# Patient Record
Sex: MALE | Race: WHITE | NOT HISPANIC OR LATINO | Employment: OTHER | URBAN - NONMETROPOLITAN AREA
[De-identification: names, ages, dates, MRNs, and addresses within clinical notes are randomized per-mention and may not be internally consistent; named-entity substitution may affect disease eponyms.]

---

## 2018-03-13 ENCOUNTER — OFFICE VISIT CONVERTED (OUTPATIENT)
Dept: FAMILY MEDICINE CLINIC | Age: 63
End: 2018-03-13
Attending: NURSE PRACTITIONER

## 2018-03-20 ENCOUNTER — OFFICE VISIT CONVERTED (OUTPATIENT)
Dept: FAMILY MEDICINE CLINIC | Age: 63
End: 2018-03-20
Attending: NURSE PRACTITIONER

## 2018-07-16 ENCOUNTER — OFFICE VISIT CONVERTED (OUTPATIENT)
Dept: FAMILY MEDICINE CLINIC | Age: 63
End: 2018-07-16
Attending: NURSE PRACTITIONER

## 2018-12-12 ENCOUNTER — OFFICE VISIT CONVERTED (OUTPATIENT)
Dept: FAMILY MEDICINE CLINIC | Age: 63
End: 2018-12-12
Attending: NURSE PRACTITIONER

## 2021-02-10 ENCOUNTER — OFFICE VISIT CONVERTED (OUTPATIENT)
Dept: FAMILY MEDICINE CLINIC | Age: 66
End: 2021-02-10
Attending: NURSE PRACTITIONER

## 2021-05-18 NOTE — PROGRESS NOTES
AniZaheer  1955     Office/Outpatient Visit    Visit Date: Wed, Feb 10, 2021 08:37 am    Provider: Maricel Sandra N.P. (Assistant: Merissa Cano LPN)    Location: [facility]        Electronically signed by Maricel Sandra N.P. on  02/10/2021 09:31:41 AM                             Subjective:        CC: Mr. Law is a 65 year old White male.  This is his first visit to the clinic.  since 2018        HPI:           PHQ-9 Depression Screening: Completed form scanned and in chart; Total Score 0           Essential (primary) hypertension details; this was first diagnosed more than 5 years ago.  Current nonpharmacologic treatment includes exercise and a 12 pound weight loss.  He is not currently taking an antihypertensive.  had been taking lisinopril 40 mg daily until 3 yrs ago.  became less active and admits to poor diet.  also with strong family hx of HTN.  tolerated lisinopril without difficulty.  wants to restart lisinopril.      ROS:     CONSTITUTIONAL:  Negative for chills, fatigue, fever, and weight change.      CARDIOVASCULAR:  Negative for chest pain, palpitations, tachycardia, orthopnea, and edema.      RESPIRATORY:  Negative for cough, dyspnea, and hemoptysis.      GASTROINTESTINAL:  Negative for abdominal pain, heartburn, constipation, diarrhea, and stool changes.      GENITOURINARY:  Negative for dysuria, genital lesions, hematuria, impotence, polyuria, and changes in urine stream.      NEUROLOGICAL:  Negative for dizziness, headaches, paresthesias, and weakness.      PSYCHIATRIC:  Negative for anxiety, depression, and sleep disturbances.          Past Medical History / Family History / Social History:         Last Reviewed on 2/10/2021 08:46 AM by Maricel Sandra    Past Medical History:             PAST MEDICAL HISTORY             PREVENTIVE HEALTH MAINTENANCE             COLORECTAL CANCER SCREENING: declines colorectal cancer screening, understands reason for  testing; NEVER     PSA: has never been done declines-  understands reason for testing.          Surgical History:     NONE         Family History:     Father: Congestive Heart Failure ( lived until 79 yo ); Hypertension; Myocardial Infarction     Mother: Lung Cancer ( diagnosed @ 82 - (+smoker) )     Paternal Grandfather: Myocardial Infarction     Paternal Grandmother: Cancer (unspecified type)     Maternal Grandfather: Cirrhosis ( r/t ETOH )     Maternal Grandmother: Lung Cancer         Social History:     Occupation: [employer] -     Marital Status:      Children: None         Tobacco/Alcohol/Supplements:     Last Reviewed on 2/10/2021 08:40 AM by Merissa Cano    Tobacco: He has a past history of cigarette smoking; quit date:  2016.   Current Smoker: He currently smokes every day, E-Cigarette.  Non-drinker     Caffeine:  He admits to consuming caffeine via coffee ( 3 servings per day ).          Substance Abuse History:     Last Reviewed on 3/13/2018 05:30 PM by Johanny Henry    None         Mental Health History:     Last Reviewed on 3/13/2018 05:30 PM by Johanny Henry    NEGATIVE         Communicable Diseases (eg STDs):     Last Reviewed on 3/13/2018 05:30 PM by Johanny Henry    Reportable health conditions;     Hepatitis (unsure which kind)         Current Problems:     Last Reviewed on 2/10/2021 08:46 AM by Maricel Sandra    Essential (primary) hypertension    Encounter for screening for depression        Immunizations:     None        Allergies:     Last Reviewed on 12/12/2018 11:33 AM by Spurling, Sarah C    No Known Allergies.        Current Medications:     Last Reviewed on 12/12/2018 11:37 AM by Spurling, Sarah C    None        Objective:        Vitals:         Historical:     12/12/2018  BP:   161/89 mm Hg ( (left arm, , sitting, );) 7/16/2018  BP:   156/90 mm Hg ( (left arm, , sitting, );) 3/20/2018  BP:   178/82 mm Hg ( (left arm, , sitting, );) 12/12/2018  Wt:    246.8lbs    Current: 2/10/2021 8:44:59 AM    Ht:  6 ft, 0 in;  Wt: 243 lbs;  BMI: 33.0T: 96.8 F (oral);  BP: 192/96 mm Hg (left arm, sitting);  P: 103 bpm (left arm (BP Cuff), sitting)        Exams:     PHYSICAL EXAM:     GENERAL:  well developed and nourished; appropriately groomed; in no apparent distress;     NECK: thyroid is non-palpable;     RESPIRATORY: normal respiratory rate and pattern with no distress; normal breath sounds with no rales, rhonchi, wheezes or rubs;     CARDIOVASCULAR: normal rate; rhythm is regular;  no edema;     MUSCULOSKELETAL:  Normal range of motion, strength and tone;     NEUROLOGIC: mental status: alert and oriented x 3; GROSSLY INTACT     PSYCHIATRIC:  appropriate affect and demeanor; normal speech pattern; grossly normal memory;         Assessment:         Z13.31   Encounter for screening for depression       I10   Essential (primary) hypertension           ORDERS:         Meds Prescribed:       [New Rx] lisinopriL 40 mg oral tablet [take 1 tablet (40 mg) by oral route once daily], #90 (ninety) tablets, Refills: 1 (one)         Lab Orders:       [id number]  HTNLP - [facility] CMP AND LIPID: [id number]  (Send-Out)              Other Orders:         Depression screen negative  (In-House)                      Plan:         Encounter for screening for depression    MIPS PHQ-9 Depression Screening: Completed form scanned and in chart; Total Score 0; Negative Depression Screen           Orders:         Depression screen negative  (In-House)              Essential (primary) hypertension    LABORATORY:  Labs ordered to be performed today include HTN/Lipid Panel: CMP, Lipid.      RECOMMENDATIONS given include: perform routine monitoring of blood pressure with home blood pressure cuff, have spouse or friend monitor for loud snoring/sleep apnea, reduction of dietary salt intake, take medication as prescribed, try not to miss doses, smoking cessation, weight loss, stress reduction, and has done  well on lisinopril in the past.  need to get labs as below.  wife is a nurse and monitoring bp at home.  follow up in 4-6 wks if bp not under control on lisinopril.  pt verbalized understanding..            Prescriptions:       [New Rx] lisinopriL 40 mg oral tablet [take 1 tablet (40 mg) by oral route once daily], #90 (ninety) tablets, Refills: 1 (one)           Orders:       [id number]  HTN - [facility] CMP AND LIPID: [id number]  (Send-Out)                Patient Education Handouts:       High Blood Pressure (HTN)              Patient Recommendations:        For  Essential (primary) hypertension:    Begin monitoring your blood pressure by brief nurse visits at our office, a home blood pressure monitor, or by checking on the machines in pharmacies or stores.  Keep a log of the readings. Obstructive sleep apnea is much more prevalent than historically reported and is a greatly under recognized cause of hypertension. If you have loud snoring, if you wake up tired and feel tired thru out the day, you may have sleep apnea.  One way to suspect this is if a loved one reports that you snore very loudly or if you seem to quit breathing for a time while you are asleep.  If this describes you, you should consult your doctor about have a sleep study performed. Reduce the amount of salt in your diet. Stop smoking! Try to lose some weight; even modest weight reduction can improve your blood pressure. Try and reduce the effects of stress on blood pressure by relaxation, meditation, biofeedback, exercise or other stress reduction methods.              Charge Capture:         Primary Diagnosis:     Z13.31  Encounter for screening for depression           Orders:      [id number]  Office/outpatient visit; established patient, level 3  (In-House)              Depression screen negative  (In-House)              I10  Essential (primary) hypertension

## 2021-05-18 NOTE — PROGRESS NOTES
Zaheer Law 1955     Office/Outpatient Visit    Visit Date: Wed, Dec 12, 2018 11:33 am    Provider: Johanny Henry N.P. (Assistant: Sarah Spurling, MA)    Location: [facility]        Electronically signed by Johanny Henry N.P. on  12/16/2018 11:23:49 PM                             SUBJECTIVE:        CC:     Mr. Law is a 63 year old White male.  Pt is here for med refills (not sure of the name).          HPI:         Mr. Lwa presents with hypertension.  This was first diagnosed several years ago.  He is not using any nonpharmacologic treatment modalities.  He is not currently taking an antihypertensive.  Mr. Law does not check his blood pressure other than at his clinic appointments.  He is tolerating the medication well without side effects.  Compliance with treatment has been poor; he has not been taking his medications, does not follow a diet and exercise regimen, and does not follow-up as directed.      ROS:     CONSTITUTIONAL:  Negative for chills, fatigue and fever.      CARDIOVASCULAR:  Negative for chest pain and pedal edema.      RESPIRATORY:  Negative for recent cough and dyspnea.      GASTROINTESTINAL:  Negative for abdominal pain, constipation, diarrhea, heartburn, nausea and vomiting.      NEUROLOGICAL:  Negative for headaches.          PM/FMH/SH:     Last Reviewed on 7/16/2018 11:47 AM by Johanny Henry    Past Medical History:             PAST MEDICAL HISTORY             PREVENTIVE HEALTH MAINTENANCE             COLORECTAL CANCER SCREENING: NEVER     EYE EXAM: was last done 2015         Surgical History:     NONE         Family History:     Father: Congestive Heart Failure ( lived until 79 yo ); Myocardial Infarction     Mother: Lung Cancer ( diagnosed @ 82 - (+smoker) )     Paternal Grandfather: Myocardial Infarction     Paternal Grandmother: Cancer (unspecified type)     Maternal Grandfather: Cirrhosis ( r/t ETOH )     Maternal Grandmother: Lung Cancer         Social  History:     Occupation: [employer] -     Marital Status:      Children: None         Tobacco/Alcohol/Supplements:     Last Reviewed on 12/12/2018 11:33 AM by Spurling, Sarah C    Tobacco: He has a past history of cigarette smoking; quit date:  2016.   Current Smoker: He currently smokes every day, E-Cigarette.  Non-drinker     Caffeine:  He admits to consuming caffeine via coffee ( 3 servings per day ).          Substance Abuse History:     Last Reviewed on 3/13/2018 05:30 PM by Johanny Henry         Mental Health History:     Last Reviewed on 3/13/2018 05:30 PM by Johanny Henry    NEGATIVE         Communicable Diseases (eg STDs):     Last Reviewed on 3/13/2018 05:30 PM by Johanny Henry    Reportable health conditions;     Hepatitis (unsure which kind)             Current Problems:     Last Reviewed on 7/16/2018 11:36 AM by Johanyn Henry    Hypertension         Immunizations:     None        Allergies:     Last Reviewed on 12/12/2018 11:33 AM by Spurling, Sarah C      No Known Drug Allergies.         Current Medications:     Last Reviewed on 12/12/2018 11:37 AM by Spurling, Sarah C    None        OBJECTIVE:        Vitals:         Current: 12/12/2018 11:38:25 AM    Ht:  6 ft, 1 in;  Wt: 246.8 lbs;  BMI: 32.6    T: 98.4 F (oral);  BP: 161/89 mm Hg (left arm, sitting);  P: 93 bpm (left arm (BP Cuff), sitting)        Repeat: 158/92         Exams:     PHYSICAL EXAM:     GENERAL: Vitals recorded well developed, well nourished,  mildly obese;  no apparent distress;     E/N/T: EARS: external auditory canal normal bilaterally;  bilateral TMs are normal;  NOSE:  normal nasal mucosa, septum, turbinates, and sinuses; OROPHARYNX:  normal mucosa, dentition, gingiva, and posterior pharynx;     RESPIRATORY: normal appearance and symmetric expansion of chest wall; normal respiratory rate and pattern with no distress;     CARDIOVASCULAR: normal rate; rhythm is regular;  no edema;      GASTROINTESTINAL: nontender; normal bowel sounds; no organomegaly;     LYMPHATIC: no enlargement of cervical or facial nodes; no supraclavicular nodes;     MUSCULOSKELETAL: normal gait; normal range of motion of all major muscle groups; no limb or joint pain with range of motion;     NEUROLOGIC: mental status: alert and oriented x 3;     PSYCHIATRIC: appropriate affect and demeanor; normal speech pattern; normal thought and perception;         ASSESSMENT           401.1   I10  Hypertension              DDx:         ORDERS:         Meds Prescribed:       Lisinopril 40mg Tablet 1 tab daily  #30 (Thirty) tablet(s) Refills: 2         Lab Orders:       [id number]  [facility] CMP AND LIPID: [id number]  (Send-Out)                   PLAN:          Hypertension recommend to check at [facility] or [facility] when not in office,  If continues to stay elevated, will need to RTO for additional medication or make some lifestyle  labs completed.      LABORATORY:  Labs ordered to be performed today include HTN/Lipid Panel: CMP, Lipid.      FOLLOW-UP:.            Prescriptions:       Lisinopril 40mg Tablet 1 tab daily  #30 (Thirty) tablet(s) Refills: 2           Orders:       [id number]  [facility] CMP AND LIPID: [id number]  (Send-Out)               Patient Recommendations:        For  Hypertension:                     APPOINTMENT INFORMATION:        Monday Tuesday Wednesday Thursday Friday Saturday Sunday            Time:___________________AM  PM   Date:_____________________             CHARGE CAPTURE           **Please note: ICD descriptions below are intended for billing purposes only and may not represent clinical diagnoses**        Primary Diagnosis:         401.1 Hypertension            I10    Essential (primary) hypertension              Orders:          [id number]   Office/outpatient visit; established patient, level 4  (In-House)

## 2021-05-18 NOTE — PROGRESS NOTES
Zaheer Law 1955     Office/Outpatient Visit    Visit Date: Mon, Jul 16, 2018 11:05 am    Provider: Johanny Henry N.P. (Assistant: Patience Medina RN)    Location: [facility]        Electronically signed by Johanny Henry N.P. on  07/16/2018 12:36:29 PM                             SUBJECTIVE:        CC:     Mr. Law is a 63 year old White male.  Medication Refill         HPI:         Patient presents with hypertension.  His current cardiac medication regimen includes an ACE inhibitor ( Lisinopril ).  Review of his blood pressure log reveals systolics in the 130s and diastolics in the 70s.  He is tolerating the medication well without side effects.  Compliance with treatment has been good; he takes his medication as directed and follows up as directed.      ROS:     CONSTITUTIONAL:  Negative for chills, fatigue, fever and weight change.      CARDIOVASCULAR:  Negative for chest pain.      RESPIRATORY:  Negative for dyspnea and cough.      GASTROINTESTINAL:  Negative for abdominal pain, heartburn, constipation, diarrhea, and stool changes.      NEUROLOGICAL:  Negative for dizziness.      PSYCHIATRIC:  Positive for feelings of stress ( (mom diagnosed with lung cancer) ).   Negative for anxiety or depression.          [facility]/:     Last Reviewed on 7/16/2018 11:47 AM by Johanny Henry    Past Medical History:             PAST MEDICAL HISTORY             PREVENTIVE HEALTH MAINTENANCE             COLORECTAL CANCER SCREENING: NEVER     EYE EXAM: was last done 2015         Surgical History:     NONE         Family History:     Father: Congestive Heart Failure ( lived until 79 yo ); Myocardial Infarction     Mother: Lung Cancer ( diagnosed @ 82 - (+smoker) )     Paternal Grandfather: Myocardial Infarction     Paternal Grandmother: Cancer (unspecified type)     Maternal Grandfather: Cirrhosis ( r/t ETOH )     Maternal Grandmother: Lung Cancer         Social History:     Occupation: [employer]  -     Marital Status:      Children: None         Tobacco/Alcohol/Supplements:     Last Reviewed on 7/16/2018 11:07 AM by Patience Medina    Tobacco: He has a past history of cigarette smoking; quit date:  2016.   Current Smoker: He currently smokes every day, E-Cigarette.  Non-drinker     Caffeine:  He admits to consuming caffeine via coffee ( 3 servings per day ).          Substance Abuse History:     Last Reviewed on 3/13/2018 05:30 PM by Johanny Henry    None         Mental Health History:     Last Reviewed on 3/13/2018 05:30 PM by Johanny Henry    NEGATIVE         Communicable Diseases (eg STDs):     Last Reviewed on 3/13/2018 05:30 PM by Johanny Henry    Reportable health conditions;     Hepatitis (unsure which kind)             Current Problems:     Last Reviewed on 7/16/2018 11:36 AM by Johanny Henry    Hypertension         Immunizations:     None        Allergies:     Last Reviewed on 7/16/2018 11:07 AM by Patience Medina      No Known Drug Allergies.         Current Medications:     Last Reviewed on 7/16/2018 11:36 AM by Johanny Henry    Lisinopril 40mg Tablet 1 tab daily         OBJECTIVE:        Vitals:         Historical:     03/20/2018  BP:   178/82 mm Hg ( (left arm, , sitting, );)     03/20/2018  BP:   171/88 mm Hg ( (left arm, , sitting, );)         Current: 7/16/2018 11:07:24 AM    Ht:  6 ft, 1 in;  Wt: 247 lbs;  BMI: 32.6    T: 97.3 F (oral);  BP: 152/87 mm Hg (right arm, sitting);  P: 87 bpm (right arm (BP Cuff), sitting)        Repeat:     11:09:52 AM     BP:   156/90mm Hg (left arm, sitting)         Exams:     PHYSICAL EXAM:     GENERAL: Vitals recorded well developed, well nourished;  well groomed;  no apparent distress;     NECK:  supple, full ROM; no thyromegaly; no carotid bruits;     RESPIRATORY: normal respiratory rate and pattern with no distress; normal breath sounds with no rales, rhonchi, wheezes or rubs;     CARDIOVASCULAR: normal rate; rhythm is regular;   normal S1; normal S2; no systolic murmur; no cyanosis; no edema;     GASTROINTESTINAL: nontender; normal bowel sounds; no organomegaly;     MUSCULOSKELETAL: normal gait; normal range of motion of all major muscle groups; no limb or joint pain with range of motion;     NEUROLOGICAL:  cranial nerves, motor and sensory function, reflexes, gait and coordination are all intact;     PSYCHIATRIC:  appropriate affect and demeanor; normal speech pattern; grossly normal memory;         ASSESSMENT:           401.1   I10  Hypertension              DDx:         ORDERS:         Meds Prescribed:       Refill of: Lisinopril 40mg Tablet 1 tab daily  #90 (Ninety) tablet(s) Refills: 0         Lab Orders:       FUTURE  Future order to be done at patients convenience  (Send-Out)         [id number]  BMP [facility] Basic Metabolic Panel  (Send-Out)         [id number]  [facility] Lipid Panel  (Send-Out)                   PLAN:          Hypertension         RECOMMENDATIONS given include: recommend labs.      FOLLOW-UP TESTING #1: FOLLOW-UP LABORATORY:  Labs to be scheduled in the future include BMP and lipid panel.            Prescriptions:       Refill of: Lisinopril 40mg Tablet 1 tab daily  #90 (Ninety) tablet(s) Refills: 0           Orders:       FUTURE  Future order to be done at patients convenience  (Send-Out)         [id number]  BMP - [facility] Basic Metabolic Panel  (Send-Out)         [id number]  [facility] Lipid Panel  (Send-Out)             Patient Education Handouts:       [facility]              Patient Recommendations:        For  Hypertension:     I also recommend recommend labs.          The following laboratory testing has been ordered: metabolic panel, basic lipid panel             CHARGE CAPTURE:           Primary Diagnosis:     401.1 Hypertension            I10    Essential (primary) hypertension              Orders:          [id number]   Office/outpatient visit; established patient, level 4  (In-House)               ADDENDUMS:       ____________________________________    Addendum: 07/20/2018 09:39 Johanny Lundberg        change office visit code to [id number]

## 2021-05-18 NOTE — PROGRESS NOTES
AniZaheer 1955     Office/Outpatient Visit    Visit Date: Tue, Mar 20, 2018 12:00 pm    Provider: Johanny Henry N.P. (Assistant: Jany Suárez MA)    Location: [facility]        Electronically signed by Johanny Henry N.P. on  03/20/2018 06:23:22 PM                             SUBJECTIVE:        CC:     Mr. Law is a 62 year old White male.  check up on rash         HPI:         Patient presents with hypertension.  This was first diagnosed several years ago.  Current nonpharmacologic treatment includes exercise.  He is not currently taking an antihypertensive.  He has not kept a blood pressure diary, but states that typical readings show systolics in the 160-180 range.  He would like to try exercise to help lower his blood pressure - as this has helped in the past         Complaint of herpes zoster, with unspecified complication..  The symptom began 2 weeks ago.  He was in last week and was put on antiviral - rash has since looked better, he is having an increase in sensitivity to the area- especially the left occipital area     ROS:     CONSTITUTIONAL:  Positive for fatigue.   Negative for chills or fever.      EYES:  Positive for use of glasses.   Negative for blurred vision or eye pain.      CARDIOVASCULAR:  Negative for chest pain and pedal edema.      RESPIRATORY:  Negative for recent cough and dyspnea.      GASTROINTESTINAL:  Negative for abdominal pain, constipation, diarrhea, heartburn, nausea and vomiting.      MUSCULOSKELETAL:  Positive for limb pain ( bilateral arms, left worse than right ).   Negative for arthralgias or myalgias.      INTEGUMENTARY:  Positive for rash (left side of face, neck and occipital area).   Negative for pruritis.      NEUROLOGICAL:  Negative for dizziness and headaches.          PMH/FMH/SH:     Last Reviewed on 3/13/2018 05:30 PM by Johanny Henry    Past Medical History:             PAST MEDICAL HISTORY             PREVENTIVE HEALTH MAINTENANCE              COLORECTAL CANCER SCREENING: NEVER     EYE EXAM: was last done 2015         Surgical History:     NONE         Family History:     Father: Congestive Heart Failure ( lived until 81 yo ); Myocardial Infarction     Mother: Lung Cancer ( diagnosed @ 82 - (+smoker) )     Paternal Grandfather: Myocardial Infarction     Paternal Grandmother: Cancer (unspecified type)     Maternal Grandfather: Cirrhosis ( r/t ETOH )     Maternal Grandmother: Lung Cancer         Social History:     Occupation: [employer] -     Marital Status:      Children: None         Tobacco/Alcohol/Supplements:     Last Reviewed on 3/20/2018 12:03 PM by Jany Suárez    Tobacco: He has a past history of cigarette smoking; quit date:  2016.   Current Smoker: He currently smokes every day, E-Cigarette.  Non-drinker     Caffeine:  He admits to consuming caffeine via coffee ( 3 servings per day ).          Substance Abuse History:     Last Reviewed on 3/13/2018 05:30 PM by Johanny Henry    None         Mental Health History:     Last Reviewed on 3/13/2018 05:30 PM by Johanny Henry    NEGATIVE         Communicable Diseases (eg STDs):     Last Reviewed on 3/13/2018 05:30 PM by Johanny Henry    Reportable health conditions;     Hepatitis (unsure which kind)             Current Problems:     Last Reviewed on 3/13/2018 05:30 PM by Johanny Henry    Hypertension     Herpes zoster, with unspecified complication         Immunizations:     None        Allergies:     Last Reviewed on 3/20/2018 12:03 PM by Jany Suárez      No Known Drug Allergies.         Current Medications:     Last Reviewed on 3/13/2018 05:30 PM by Johanny Henry    Acyclovir 400mg Tablet 1 po five times a day X 5 days         OBJECTIVE:        Vitals:         Current: 3/20/2018 12:02:30 PM    Ht:  6 ft, 1 in;  Wt: 247.9 lbs;  BMI: 32.7    T: 97.3 F (oral);  BP: 171/88 mm Hg (left arm, sitting);  P: 87 bpm (left arm (BP Cuff), sitting)        Repeat:      12:22:19 PM     BP:   178/82mm Hg (left arm, sitting)         Exams:     PHYSICAL EXAM:     GENERAL: Vitals recorded well developed, well nourished;  no apparent distress;     EYES: PERRL, EOMI     NECK: range of motion is normal;     RESPIRATORY: normal appearance and symmetric expansion of chest wall; normal respiratory rate and pattern with no distress; normal breath sounds with no rales, rhonchi, wheezes or rubs;     CARDIOVASCULAR: normal rate; rhythm is regular;  no edema;     LYMPHATIC: no enlargement of cervical or facial nodes; no supraclavicular nodes;     SKIN: Herpes zoster of left lower jaw, left side of neck, left occipital area; vesicles are scabbed over - no active drainage - tender     MUSCULOSKELETAL: normal gait; normal range of motion of all major muscle groups; no limb or joint pain with range of motion;     NEUROLOGIC: mental status: alert and oriented x 3;     PSYCHIATRIC: appropriate affect and demeanor; normal speech pattern; normal thought and perception;         ASSESSMENT           401.1   I10  Hypertension              DDx:     053.8   B02.9  Herpes zoster, with unspecified complication              DDx:         ORDERS:         Meds Prescribed:       Lisinopril 20mg Tablet 1 tab daily  #30 (Thirty) tablet(s) Refills: 1                 PLAN:          Hypertension I also would like for him to get some lab  at this time, he is 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 Law need to get his BP under control now - until he is able to exercise and get it lower, at which time we can remove medication , however, I do recommend that he be on something in the short term for better control - he is in agreeement.      FOLLOW-UP: Schedule a follow-up appointment in 4 weeks..   Chronic visit follow up           Prescriptions:       Lisinopril 20mg Tablet 1 tab daily  #30 (Thirty) tablet(s) Refills: 1           Herpes zoster, with unspecified complication         RECOMMENDATIONS given include: discussed with Mr. Ani mena of long term pain and need to come in for treatment - He will continue taking the ibuprofen OTC and if the pain worsens, will come in for management.  We also discussed if he starts to have any changes with his visiion or if lesions develop into the eye, he will need to get in to see opthamologist.              Patient Recommendations:        For  Hypertension:     I also recommend Discussed with Mr. Law need to get his BP under control now - until he is able to exercise and get it lower, at which time we can remove medication , however, I do recommend that he be on something in the short term for better control - he is in agreeement.  Schedule a follow-up visit in 4 weeks.                APPOINTMENT INFORMATION:        Monday Tuesday Wednesday Thursday Friday Saturday Sunday            Time:___________________AM  PM   Date:_____________________         For  Herpes zoster, with unspecified complication:     I also recommend discussed with Mr. Ani mena of long term pain and need to come in for treatment - He will continue taking the ibuprofen OTC and if the pain worsens, will come in for management.  We also discussed if he starts to have any changes with his visiion or if lesions develop into the eye, he will need to get in to see opthamologist.              CHARGE CAPTURE           **Please note: ICD descriptions below are intended for billing purposes only and may not represent clinical diagnoses**        Primary Diagnosis:         401.1 Hypertension            I10    Essential (primary) hypertension              Orders:          [id number]   Office/outpatient visit; established patient, level 4  (In-House)           053.8 Herpes zoster, with unspecified complication            B02.9    Zoster without complications

## 2021-05-18 NOTE — PROGRESS NOTES
AniZaheer 1955     Office/Outpatient Visit    Visit Date: Tue, Mar 13, 2018 05:07 pm    Provider: Johanny Henry N.P. (Assistant: Raya Cano MA)    Location: [facility]        Electronically signed by Johanny Henry N.P. on  03/16/2018 08:54:52 AM                             SUBJECTIVE:        CC:     Mr. Law is a 62 year old male.  This is his first visit to the clinic.  Establish Care/ Rash         HPI:         Patient to be evaluated for rash.  This has been a problem for the past 3 days.  The rash has not occurred previously.  It is located primarily on the cheeks.  only on the left side of his face - left lower jaw, down into his neck, He describes the rash as pink and papular.  The rash is moderately sensitive to touch.  Possible contributing factors include use of a new soap or facial cleanser.      ROS:     CONSTITUTIONAL:  Negative for chills, fatigue, fever and weight change.      CARDIOVASCULAR:  Negative for chest pain, orthopnea, paroxysmal nocturnal dyspnea and pedal edema.      RESPIRATORY:  Negative for dyspnea and cough.      GASTROINTESTINAL:  Negative for abdominal pain, heartburn, constipation, diarrhea, and stool changes.      INTEGUMENTARY:  Positive for rash.      PSYCHIATRIC:  Negative for anxiety and depression.          PMH/FMH/SH:     Last Reviewed on 3/13/2018 05:30 PM by Johanny Henry    Past Medical History:             PAST MEDICAL HISTORY             PREVENTIVE HEALTH MAINTENANCE             COLORECTAL CANCER SCREENING: NEVER     EYE EXAM: was last done 2015         Surgical History:     NONE         Family History:     Father: Congestive Heart Failure ( lived until 79 yo ); Myocardial Infarction     Mother: Lung Cancer ( diagnosed @ 82 - (+smoker) )     Paternal Grandfather: Myocardial Infarction     Paternal Grandmother: Cancer (unspecified type)     Maternal Grandfather: Cirrhosis ( r/t ETOH )     Maternal Grandmother: Lung Cancer         Social  History:     Occupation: [employer] -     Marital Status:      Children: None         Tobacco/Alcohol/Supplements:     Last Reviewed on 3/13/2018 05:30 PM by Johanny Henry    Tobacco: He has a past history of cigarette smoking; quit date:  2016.   Current Smoker: He currently smokes every day, E-Cigarette.  Non-drinker     Caffeine:  He admits to consuming caffeine via coffee ( 3 servings per day ).          Substance Abuse History:     Last Reviewed on 3/13/2018 05:30 PM by Johanny Henry         Mental Health History:     Last Reviewed on 3/13/2018 05:30 PM by Johanny Henry    NEGATIVE         Communicable Diseases (eg STDs):     Last Reviewed on 3/13/2018 05:30 PM by Johanny Henry    Reportable health conditions;     Hepatitis (unsure which kind)             Current Problems:     Last Reviewed on 3/13/2018 05:30 PM by Johanny Henry    Herpes zoster, with unspecified complication         Immunizations:     None        Allergies:     Last Reviewed on 3/13/2018 05:30 PM by Johanny Henry      No Known Drug Allergies.         Current Medications:     Last Reviewed on 3/13/2018 05:30 PM by Johanny Henry    None        OBJECTIVE:        Vitals:         Current: 3/13/2018 5:11:22 PM    Ht:  6 ft, 1 in;  Wt: 252 lbs;  BMI: 33.2    T: 97.6 F (oral);  BP: 178/100 mm Hg (left arm, sitting);  P: 110 bpm (left arm (BP Cuff), sitting)        Repeat:     5:14:43 PM     BP:   178/98mm Hg (left arm, sitting)         Exams:     PHYSICAL EXAM:     GENERAL: Vitals recorded well developed, well nourished;  well groomed;  no apparent distress;     NECK:  supple, full ROM; no thyromegaly; no carotid bruits;     RESPIRATORY: normal respiratory rate and pattern with no distress; normal breath sounds with no rales, rhonchi, wheezes or rubs;     CARDIOVASCULAR: normal rate; rhythm is regular;  normal S1; normal S2; no systolic murmur; no cyanosis; no edema;     SKIN: rash, somewhat vesicular,  somewhat papular insome areas - clusters , unilateral;     MUSCULOSKELETAL:  Normal range of motion, strength and tone;     NEUROLOGICAL:  cranial nerves, motor and sensory function, reflexes, gait and coordination are all intact;     PSYCHIATRIC:  appropriate affect and demeanor; normal speech pattern; grossly normal memory;         ASSESSMENT:           053.8   B02.8  Herpes zoster, with unspecified complication              DDx:         ORDERS:         Meds Prescribed:       Acyclovir 400mg Tablet 1 po five times a day X 5 days  #25 (Twenty Five) tablet(s) Refills: 0                 PLAN:          Herpes zoster, with unspecified complication         RECOMMENDATIONS given include: given the unilateral appearance of the rash and some vesicular lesions, I am treating as shingles - will have him follow up next week, if unresolved or no improvement - will send to derm.            Prescriptions:       Acyclovir 400mg Tablet 1 po five times a day X 5 days  #25 (Twenty Five) tablet(s) Refills: 0             Patient Recommendations:        For  Herpes zoster, with unspecified complication:     I also recommend given the unilateral appearance of the rash and some vesicular lesions, I am treating as shingles - will have him follow up next week, if unresolved or no improvement - will send to derm.              CHARGE CAPTURE:           Primary Diagnosis:     053.8 Herpes zoster, with unspecified complication            B02.8    Zoster with other complications              Orders:          [id number]   Office visit - new pt, level 3  (In-House)

## 2021-07-01 VITALS
DIASTOLIC BLOOD PRESSURE: 90 MMHG | WEIGHT: 247 LBS | HEART RATE: 87 BPM | TEMPERATURE: 97.3 F | HEIGHT: 73 IN | BODY MASS INDEX: 32.74 KG/M2 | SYSTOLIC BLOOD PRESSURE: 156 MMHG

## 2021-07-01 VITALS
HEIGHT: 73 IN | DIASTOLIC BLOOD PRESSURE: 89 MMHG | HEART RATE: 93 BPM | WEIGHT: 246.8 LBS | BODY MASS INDEX: 32.71 KG/M2 | SYSTOLIC BLOOD PRESSURE: 161 MMHG | TEMPERATURE: 98.4 F

## 2021-07-01 VITALS
HEIGHT: 73 IN | TEMPERATURE: 97.6 F | WEIGHT: 252 LBS | BODY MASS INDEX: 33.4 KG/M2 | HEART RATE: 110 BPM | SYSTOLIC BLOOD PRESSURE: 178 MMHG | DIASTOLIC BLOOD PRESSURE: 98 MMHG

## 2021-07-01 VITALS
DIASTOLIC BLOOD PRESSURE: 82 MMHG | WEIGHT: 247.9 LBS | BODY MASS INDEX: 32.86 KG/M2 | HEART RATE: 87 BPM | TEMPERATURE: 97.3 F | SYSTOLIC BLOOD PRESSURE: 178 MMHG | HEIGHT: 73 IN

## 2021-07-02 VITALS
HEIGHT: 72 IN | WEIGHT: 243 LBS | SYSTOLIC BLOOD PRESSURE: 192 MMHG | TEMPERATURE: 96.8 F | BODY MASS INDEX: 32.91 KG/M2 | DIASTOLIC BLOOD PRESSURE: 96 MMHG | HEART RATE: 103 BPM

## 2021-08-26 RX ORDER — LISINOPRIL 40 MG/1
TABLET ORAL
Qty: 30 TABLET | Refills: 0 | Status: SHIPPED | OUTPATIENT
Start: 2021-08-26 | End: 2021-09-20 | Stop reason: SDUPTHER

## 2021-09-20 ENCOUNTER — OFFICE VISIT (OUTPATIENT)
Dept: FAMILY MEDICINE CLINIC | Age: 66
End: 2021-09-20

## 2021-09-20 VITALS
HEART RATE: 107 BPM | HEIGHT: 72 IN | SYSTOLIC BLOOD PRESSURE: 182 MMHG | DIASTOLIC BLOOD PRESSURE: 87 MMHG | BODY MASS INDEX: 34.02 KG/M2 | WEIGHT: 251.2 LBS | TEMPERATURE: 97.6 F

## 2021-09-20 DIAGNOSIS — Z13.6 SCREENING FOR CARDIOVASCULAR CONDITION: ICD-10-CM

## 2021-09-20 DIAGNOSIS — Z12.5 SCREENING FOR MALIGNANT NEOPLASM OF PROSTATE: ICD-10-CM

## 2021-09-20 DIAGNOSIS — R89.9 ABNORMAL LABORATORY TEST: ICD-10-CM

## 2021-09-20 DIAGNOSIS — Z12.11 SCREENING FOR COLON CANCER: ICD-10-CM

## 2021-09-20 DIAGNOSIS — I10 ESSENTIAL (PRIMARY) HYPERTENSION: Primary | Chronic | ICD-10-CM

## 2021-09-20 DIAGNOSIS — Z11.59 SCREENING FOR VIRAL DISEASE: ICD-10-CM

## 2021-09-20 PROCEDURE — 99214 OFFICE O/P EST MOD 30 MIN: CPT | Performed by: NURSE PRACTITIONER

## 2021-09-20 RX ORDER — LISINOPRIL 40 MG/1
40 TABLET ORAL DAILY
Qty: 30 TABLET | Refills: 0 | Status: SHIPPED | OUTPATIENT
Start: 2021-09-20 | End: 2021-10-25 | Stop reason: SDUPTHER

## 2021-09-20 NOTE — ASSESSMENT & PLAN NOTE
Hypertension is worsening.  Continue current treatment regimen.  Dietary sodium restriction.  Weight loss.  Regular aerobic exercise.  Continue current medications.  Blood pressure will be reassessed in 4 weeks.

## 2021-09-20 NOTE — PROGRESS NOTES
Chief Complaint  Zaheer Law presents to [facility] for Hypertension (med refill )    Subjective          Zaheer presents for follow up on hypertension.  Compliance with medication is reported as poor.  Hit and miss with taking.  Recently mother passed away so been under more stress.    Check of BP at home reported as around 130s   No current symptoms with elevations                Review of Systems   Constitutional: Positive for unexpected weight gain. Negative for fatigue and fever.   Respiratory: Negative for cough and shortness of breath.    Cardiovascular: Negative for chest pain.   Gastrointestinal: Negative for abdominal pain, nausea and GERD.   Musculoskeletal: Negative for arthralgias and back pain.   Psychiatric/Behavioral: Positive for stress. Negative for dysphoric mood. The patient is not nervous/anxious.          No Known Allergies   Past Medical History:   Diagnosis Date   • Essential (primary) hypertension      Current Outpatient Medications   Medication Sig Dispense Refill   • lisinopril (PRINIVIL,ZESTRIL) 40 MG tablet Take 1 tablet by mouth Daily for 30 days. No more refills until labs complete 30 tablet 0     No current facility-administered medications for this visit.     History reviewed. No pertinent surgical history.   Social History     Tobacco Use   • Smoking status: Former Smoker     Packs/day: 1.00     Years: 40.00     Pack years: 40.00     Types: Cigarettes     Quit date:      Years since quittin.7   • Smokeless tobacco: Never Used   Vaping Use   • Vaping Use: Every day   Substance Use Topics   • Alcohol use: Never   • Drug use: Never     Family History   Problem Relation Age of Onset   • Lung cancer Mother 82        SMOKER   • COPD Mother    • Heart failure Father    • Hypertension Father    • Lung cancer Maternal Grandmother    • Cirrhosis Maternal Grandfather         R/T ETOH   • Cancer Paternal Grandmother    • Heart attack Paternal Grandfather   "    Health Maintenance Due   Topic Date Due   • COLORECTAL CANCER SCREENING  Never done   • COVID-19 Vaccine (1) Never done   • ZOSTER VACCINE (1 of 2) Never done   • LUNG CANCER SCREENING  Never done   • HEPATITIS C SCREENING  Never done      Immunization History   Administered Date(s) Administered   • Tdap 01/01/2013        Objective     Vitals:    09/20/21 0906 09/20/21 0910   BP: (!) 195/87 (!) 182/87   BP Location: Left arm Left arm   Patient Position: Sitting Sitting   Pulse: 112 107   Temp: 97.6 °F (36.4 °C)    Weight: 114 kg (251 lb 3.2 oz)    Height: 182.9 cm (72.01\")      Body mass index is 34.06 kg/m².     Physical Exam  Vitals reviewed.   Constitutional:       Appearance: Normal appearance.   HENT:      Head: Normocephalic.   Eyes:      Pupils: Pupils are equal, round, and reactive to light.   Cardiovascular:      Rate and Rhythm: Normal rate and regular rhythm.      Heart sounds: No murmur heard.     Pulmonary:      Effort: Pulmonary effort is normal.      Breath sounds: Normal breath sounds.   Musculoskeletal:         General: Normal range of motion.   Neurological:      Mental Status: He is alert.   Psychiatric:         Mood and Affect: Mood normal.         Behavior: Behavior normal.           Result Review :                               Assessment and Plan      Diagnoses and all orders for this visit:    1. Essential (primary) hypertension (Primary)  Comments:  discussed need for labs, will only offer 30 day supply until labs completed - compliance with medication discussed.  Low sodium and 6 month follow up   Assessment & Plan:  Hypertension is worsening.  Continue current treatment regimen.  Dietary sodium restriction.  Weight loss.  Regular aerobic exercise.  Continue current medications.  Blood pressure will be reassessed in 4 weeks.    Orders:  -     lisinopril (PRINIVIL,ZESTRIL) 40 MG tablet; Take 1 tablet by mouth Daily for 30 days. No more refills until labs complete  Dispense: 30 tablet; " Refill: 0    2. Screening for cardiovascular condition  -     Comprehensive metabolic panel; Future  -     Lipid panel; Future    3. Screening for colon cancer  -     Cologuard - Stool, Per Rectum; Future    4. Screening for malignant neoplasm of prostate  -     PSA SCREENING; Future    5. Screening for viral disease  -     Hepatitis C antibody; Future            Follow Up     Return in about 6 months (around 3/20/2022).

## 2021-09-22 ENCOUNTER — LAB (OUTPATIENT)
Dept: LAB | Facility: HOSPITAL | Age: 66
End: 2021-09-22

## 2021-09-22 DIAGNOSIS — Z12.5 SCREENING FOR MALIGNANT NEOPLASM OF PROSTATE: ICD-10-CM

## 2021-09-22 DIAGNOSIS — Z13.6 SCREENING FOR CARDIOVASCULAR CONDITION: ICD-10-CM

## 2021-09-22 DIAGNOSIS — Z11.59 SCREENING FOR VIRAL DISEASE: ICD-10-CM

## 2021-09-22 LAB
ALBUMIN SERPL-MCNC: 4.3 G/DL (ref 3.5–5.2)
ALBUMIN/GLOB SERPL: 1.4 G/DL
ALP SERPL-CCNC: 84 U/L (ref 39–117)
ALT SERPL W P-5'-P-CCNC: 19 U/L (ref 1–41)
ANION GAP SERPL CALCULATED.3IONS-SCNC: 9.8 MMOL/L (ref 5–15)
AST SERPL-CCNC: 23 U/L (ref 1–40)
BILIRUB SERPL-MCNC: 0.4 MG/DL (ref 0–1.2)
BUN SERPL-MCNC: 15 MG/DL (ref 8–23)
BUN/CREAT SERPL: 15.6 (ref 7–25)
CALCIUM SPEC-SCNC: 9.6 MG/DL (ref 8.6–10.5)
CHLORIDE SERPL-SCNC: 105 MMOL/L (ref 98–107)
CHOLEST SERPL-MCNC: 202 MG/DL (ref 0–200)
CO2 SERPL-SCNC: 25.2 MMOL/L (ref 22–29)
CREAT SERPL-MCNC: 0.96 MG/DL (ref 0.76–1.27)
GFR SERPL CREATININE-BSD FRML MDRD: 79 ML/MIN/1.73
GLOBULIN UR ELPH-MCNC: 3 GM/DL
GLUCOSE SERPL-MCNC: 96 MG/DL (ref 65–99)
HCV AB SER DONR QL: REACTIVE
HDLC SERPL-MCNC: 46 MG/DL (ref 40–60)
LDLC SERPL CALC-MCNC: 118 MG/DL (ref 0–100)
LDLC/HDLC SERPL: 2.44 {RATIO}
POTASSIUM SERPL-SCNC: 4.3 MMOL/L (ref 3.5–5.2)
PROT SERPL-MCNC: 7.3 G/DL (ref 6–8.5)
PSA SERPL-MCNC: 1.81 NG/ML (ref 0–4)
SODIUM SERPL-SCNC: 140 MMOL/L (ref 136–145)
TRIGL SERPL-MCNC: 218 MG/DL (ref 0–150)
VLDLC SERPL-MCNC: 38 MG/DL (ref 5–40)

## 2021-09-22 PROCEDURE — 86803 HEPATITIS C AB TEST: CPT

## 2021-09-22 PROCEDURE — G0103 PSA SCREENING: HCPCS

## 2021-09-22 PROCEDURE — 80053 COMPREHEN METABOLIC PANEL: CPT

## 2021-09-22 PROCEDURE — 80061 LIPID PANEL: CPT

## 2021-09-22 PROCEDURE — 36415 COLL VENOUS BLD VENIPUNCTURE: CPT

## 2021-10-25 DIAGNOSIS — I10 ESSENTIAL (PRIMARY) HYPERTENSION: Chronic | ICD-10-CM

## 2021-10-25 RX ORDER — LISINOPRIL 40 MG/1
40 TABLET ORAL DAILY
Qty: 90 TABLET | Refills: 1 | Status: SHIPPED | OUTPATIENT
Start: 2021-10-25 | End: 2022-05-11

## 2021-10-25 NOTE — TELEPHONE ENCOUNTER
Caller: KEEGAN BUSTILLO    Relationship: Emergency Contact      Medication requested (name and dosage): lisinopril (PRINIVIL,ZESTRIL) 40 MG tablet ()    Requested Prescriptions:   Requested Prescriptions     Pending Prescriptions Disp Refills   • lisinopril (PRINIVIL,ZESTRIL) 40 MG tablet 30 tablet 0     Sig: Take 1 tablet by mouth Daily for 30 days. No more refills until labs complete        Pharmacy where request should be sent: [facility] [address] [place] [address] [facility] [phone] [facility] [phone]   [phone]  Associate Signed OrdersPatient EstimateProvidersCurrent Interactions    Additional details provided by patient: 3 MONTH SUPPLY     Best call back number: [phone]    Does the patient have less than a 3 day supply:  [x] Yes  [] No    Ofelia Brothers Rep   10/25/21 11:43 EDT

## 2022-02-21 ENCOUNTER — TELEPHONE (OUTPATIENT)
Dept: FAMILY MEDICINE CLINIC | Age: 67
End: 2022-02-21

## 2022-02-23 ENCOUNTER — TELEPHONE (OUTPATIENT)
Dept: FAMILY MEDICINE CLINIC | Age: 67
End: 2022-02-23

## 2022-03-02 ENCOUNTER — TELEPHONE (OUTPATIENT)
Dept: FAMILY MEDICINE CLINIC | Age: 67
End: 2022-03-02

## 2022-05-06 DIAGNOSIS — I10 ESSENTIAL (PRIMARY) HYPERTENSION: Chronic | ICD-10-CM

## 2022-05-11 RX ORDER — LISINOPRIL 40 MG/1
TABLET ORAL
Qty: 30 TABLET | Refills: 0 | Status: SHIPPED | OUTPATIENT
Start: 2022-05-11 | End: 2022-05-16 | Stop reason: SDUPTHER

## 2022-05-16 ENCOUNTER — OFFICE VISIT (OUTPATIENT)
Dept: FAMILY MEDICINE CLINIC | Age: 67
End: 2022-05-16

## 2022-05-16 VITALS
HEIGHT: 72 IN | SYSTOLIC BLOOD PRESSURE: 192 MMHG | WEIGHT: 240.8 LBS | BODY MASS INDEX: 32.61 KG/M2 | OXYGEN SATURATION: 100 % | HEART RATE: 96 BPM | DIASTOLIC BLOOD PRESSURE: 105 MMHG

## 2022-05-16 DIAGNOSIS — R76.8 HEPATITIS C ANTIBODY TEST POSITIVE: ICD-10-CM

## 2022-05-16 DIAGNOSIS — I10 ESSENTIAL (PRIMARY) HYPERTENSION: Primary | Chronic | ICD-10-CM

## 2022-05-16 PROCEDURE — 99214 OFFICE O/P EST MOD 30 MIN: CPT | Performed by: NURSE PRACTITIONER

## 2022-05-16 RX ORDER — AMLODIPINE BESYLATE 2.5 MG/1
2.5 TABLET ORAL DAILY
Qty: 30 TABLET | Refills: 5 | Status: SHIPPED | OUTPATIENT
Start: 2022-05-16 | End: 2022-11-15

## 2022-05-16 RX ORDER — LISINOPRIL 40 MG/1
40 TABLET ORAL DAILY
Qty: 30 TABLET | Refills: 5 | Status: SHIPPED | OUTPATIENT
Start: 2022-05-16 | End: 2022-11-15

## 2022-05-16 NOTE — PROGRESS NOTES
"Assessment and Plan    Diagnoses and all orders for this visit:    1. Essential (primary) hypertension (Primary)  Comments:  add amlodipine , continue ambulatory monitoring - follow up sooner if remains elevated - labs at next visit;  avoid caffeine/ energy drinks;  ER precautions  Orders:  -     amLODIPine (NORVASC) 2.5 MG tablet; Take 1 tablet by mouth Daily.  Dispense: 30 tablet; Refill: 5  -     lisinopril (PRINIVIL,ZESTRIL) 40 MG tablet; Take 1 tablet by mouth Daily.  Dispense: 30 tablet; Refill: 5    2. Hepatitis C antibody test positive  Comments:  we will discuss further at next appt for referral to hep c clinic        Follow Up   Return if symptoms worsen or fail to improve, for Medicare Wellness.    Chief Complaint  Zaheer Ani presents to [facility] for Hypertension    Subjective          Zaheer presents today for follow up on Hypertension.  Reported as not well controlled since off his medication.    Cardiac symptoms none.  Current medication / treatment includes lisinopril (generic)  Cardiovascular risk factors: advanced age (older than 55 for men, 65 for women), dyslipidemia, hypertension and male gender  He does vape  Will drink caffeine frequently throughout the day.  He does also drink energy drinks as well.  He states that he has been out of insurance for a few months and has been unable to follow up.  He will get medicare June 1st     Regarding Hep C - he does want to wait to have treatment and further follow up until after he gets his insurance        Review of Systems    Objective     Vitals:    05/16/22 1006 05/16/22 1011   BP: (!) 193/108 (!) 192/105   Pulse: 98 96   SpO2: 100%    Weight: 109 kg (240 lb 12.8 oz)    Height: 182.9 cm (72.01\")      Body mass index is 32.65 kg/m².     Physical Exam  Vitals reviewed.   Constitutional:       Appearance: Normal appearance.   HENT:      Head: Normocephalic.   Eyes:      Pupils: Pupils are equal, round, and reactive to " light.   Cardiovascular:      Rate and Rhythm: Normal rate and regular rhythm.      Heart sounds: No murmur heard.  Pulmonary:      Effort: Pulmonary effort is normal.      Breath sounds: Normal breath sounds.   Musculoskeletal:         General: Normal range of motion.   Neurological:      Mental Status: He is alert.   Psychiatric:         Mood and Affect: Mood normal.         Behavior: Behavior normal.         Result Review :                    No Known Allergies   Past Medical History:   Diagnosis Date   • Essential (primary) hypertension      Current Outpatient Medications   Medication Sig Dispense Refill   • lisinopril (PRINIVIL,ZESTRIL) 40 MG tablet Take 1 tablet by mouth Daily. 30 tablet 5   • amLODIPine (NORVASC) 2.5 MG tablet Take 1 tablet by mouth Daily. 30 tablet 5     No current facility-administered medications for this visit.     History reviewed. No pertinent surgical history.   Social History     Tobacco Use   • Smoking status: Former Smoker     Packs/day: 1.00     Years: 40.00     Pack years: 40.00     Types: Cigarettes     Quit date:      Years since quittin.3   • Smokeless tobacco: Never Used   Vaping Use   • Vaping Use: Every day   • Substances: Nicotine, Flavoring   • Devices: Pre-filled or refillable cartridge, Refillable tank   Substance Use Topics   • Alcohol use: Not Currently   • Drug use: Never     Family History   Problem Relation Age of Onset   • Lung cancer Mother 82        SMOKER   • COPD Mother    • Heart failure Father    • Hypertension Father    • Lung cancer Maternal Grandmother    • Cirrhosis Maternal Grandfather         R/T ETOH   • Cancer Paternal Grandmother    • Heart attack Paternal Grandfather      Health Maintenance Due   Topic Date Due   • COLORECTAL CANCER SCREENING  Never done   • COVID-19 Vaccine (1) Never done   • ZOSTER VACCINE (1 of 2) Never done   • LUNG CANCER SCREENING  Never done      Immunization History   Administered Date(s) Administered   • Tdap  01/01/2013

## 2022-07-01 ENCOUNTER — TELEPHONE (OUTPATIENT)
Dept: FAMILY MEDICINE CLINIC | Age: 67
End: 2022-07-01

## 2022-07-01 NOTE — TELEPHONE ENCOUNTER
Called pt on 7/1 in regards to his cologaurd order he stated he still wanted to complete this and has the kit at home. Will complete in the next week./rah

## 2022-11-12 DIAGNOSIS — I10 ESSENTIAL (PRIMARY) HYPERTENSION: Chronic | ICD-10-CM

## 2022-11-15 RX ORDER — AMLODIPINE BESYLATE 2.5 MG/1
TABLET ORAL
Qty: 30 TABLET | Refills: 0 | Status: SHIPPED | OUTPATIENT
Start: 2022-11-15 | End: 2022-11-16 | Stop reason: SDUPTHER

## 2022-11-15 RX ORDER — LISINOPRIL 40 MG/1
TABLET ORAL
Qty: 30 TABLET | Refills: 0 | Status: SHIPPED | OUTPATIENT
Start: 2022-11-15 | End: 2022-11-16 | Stop reason: SDUPTHER

## 2022-11-16 ENCOUNTER — OFFICE VISIT (OUTPATIENT)
Dept: FAMILY MEDICINE CLINIC | Age: 67
End: 2022-11-16

## 2022-11-16 VITALS
BODY MASS INDEX: 33.72 KG/M2 | OXYGEN SATURATION: 98 % | DIASTOLIC BLOOD PRESSURE: 110 MMHG | SYSTOLIC BLOOD PRESSURE: 182 MMHG | HEIGHT: 72 IN | WEIGHT: 249 LBS | HEART RATE: 99 BPM

## 2022-11-16 DIAGNOSIS — Z53.20 RECOMMENDATION REFUSED BY PATIENT: ICD-10-CM

## 2022-11-16 DIAGNOSIS — Z12.11 SCREENING FOR COLON CANCER: ICD-10-CM

## 2022-11-16 DIAGNOSIS — I10 ESSENTIAL (PRIMARY) HYPERTENSION: Chronic | ICD-10-CM

## 2022-11-16 DIAGNOSIS — R76.8 POSITIVE HEPATITIS C ANTIBODY TEST: ICD-10-CM

## 2022-11-16 DIAGNOSIS — Z00.00 MEDICARE ANNUAL WELLNESS VISIT, SUBSEQUENT: Primary | ICD-10-CM

## 2022-11-16 DIAGNOSIS — Z87.891 HISTORY OF CIGARETTE SMOKING: ICD-10-CM

## 2022-11-16 DIAGNOSIS — Z13.6 SCREENING FOR CARDIOVASCULAR CONDITION: ICD-10-CM

## 2022-11-16 DIAGNOSIS — R91.8 ABNORMAL CT LUNG SCREENING: ICD-10-CM

## 2022-11-16 DIAGNOSIS — J43.9 PULMONARY EMPHYSEMA, UNSPECIFIED EMPHYSEMA TYPE: ICD-10-CM

## 2022-11-16 DIAGNOSIS — R91.1 INCIDENTAL LUNG NODULE, GREATER THAN OR EQUAL TO 8MM: ICD-10-CM

## 2022-11-16 PROCEDURE — 1159F MED LIST DOCD IN RCRD: CPT | Performed by: NURSE PRACTITIONER

## 2022-11-16 PROCEDURE — G0438 PPPS, INITIAL VISIT: HCPCS | Performed by: NURSE PRACTITIONER

## 2022-11-16 PROCEDURE — 99214 OFFICE O/P EST MOD 30 MIN: CPT | Performed by: NURSE PRACTITIONER

## 2022-11-16 PROCEDURE — 1170F FXNL STATUS ASSESSED: CPT | Performed by: NURSE PRACTITIONER

## 2022-11-16 RX ORDER — AMLODIPINE BESYLATE 5 MG/1
5 TABLET ORAL DAILY
Qty: 90 TABLET | Refills: 1 | Status: SHIPPED | OUTPATIENT
Start: 2022-11-16

## 2022-11-16 RX ORDER — LISINOPRIL 40 MG/1
40 TABLET ORAL DAILY
Qty: 90 TABLET | Refills: 1 | Status: SHIPPED | OUTPATIENT
Start: 2022-11-16

## 2022-11-16 NOTE — PROGRESS NOTES
The ABCs of the Annual Wellness Visit  Initial Medicare Wellness Visit    Chief Complaint   Patient presents with   • Welcome To Medicare   • Hypertension     Follow up - Due for Colonoscopy      Subjective   History of Present Illness:  Zaheer Law is a 67 y.o. male who presents for an Initial Medicare Wellness Visit.    The following portions of the patient's history were reviewed and   updated as appropriate: allergies, current medications, past family history, past medical history, past social history, past surgical history and problem list.     Compared to one year ago, the patient feels his physical   health is the same.    Compared to one year ago, the patient feels his mental   health is the same.    Zaheer presents today for follow up on Hypertension.    Current medication / treatment includes lisinopril (generic) and amlodipine.    Reported as BP checks at home: not checked .    Cardiac symptoms none.  The 10-year ASCVD risk score (Zunilda CORNELL, et al., 2019) is: 27.3%    Values used to calculate the score:      Age: 67 years      Sex: Male      Is Non- : No      Diabetic: No      Tobacco smoker: No      Systolic Blood Pressure: 182 mmHg      Is BP treated: Yes      HDL Cholesterol: 46 mg/dL      Total Cholesterol: 202 mg/dL       Recent Hospitalizations:  He was not admitted to the hospital during the last year.       Current Medical Providers:  Patient Care Team:  Johanny Henry APRN as PCP - General (Nurse Practitioner)    Outpatient Medications Prior to Visit   Medication Sig Dispense Refill   • amLODIPine (NORVASC) 2.5 MG tablet Take 1 tablet by mouth once daily 30 tablet 0   • lisinopril (PRINIVIL,ZESTRIL) 40 MG tablet Take 1 tablet by mouth once daily 30 tablet 0     No facility-administered medications prior to visit.       No opioid medication identified on active medication list. I have reviewed chart for other potential  high risk medication/s and harmful drug interactions in  "the elderly.          Aspirin is not on active medication list.  Aspirin use is not indicated based on review of current medical condition/s. Risk of harm outweighs potential benefits.  .    Patient Active Problem List   Diagnosis   • Essential (primary) hypertension     Advance Care Planning  Advance Directive is not on file.  ACP discussion was held with the patient during this visit. Patient does not have an advance directive, information provided.          Objective       Vitals:    22 0812   BP: (!) 182/110   BP Location: Left arm   Patient Position: Sitting   Cuff Size: Large Adult   Pulse: 99   SpO2: 98%   Weight: 113 kg (249 lb)   Height: 182.9 cm (72\")     Estimated body mass index is 33.77 kg/m² as calculated from the following:    Height as of this encounter: 182.9 cm (72\").    Weight as of this encounter: 113 kg (249 lb).    BMI is >= 30 and <35. (Class 1 Obesity). The following options were offered after discussion;: weight loss educational material (shared in after visit summary)      Does the patient have evidence of cognitive impairment? No    Physical Exam  Vitals reviewed.   Constitutional:       Appearance: Normal appearance.   HENT:      Head: Normocephalic.   Eyes:      Pupils: Pupils are equal, round, and reactive to light.   Cardiovascular:      Rate and Rhythm: Normal rate and regular rhythm.      Heart sounds: No murmur heard.  Pulmonary:      Effort: Pulmonary effort is normal.      Breath sounds: Normal breath sounds.   Musculoskeletal:         General: Normal range of motion.   Neurological:      Mental Status: He is alert.   Psychiatric:         Mood and Affect: Mood normal.         Behavior: Behavior normal.               HEALTH RISK ASSESSMENT    Smoking Status:  Social History     Tobacco Use   Smoking Status Former   • Packs/day: 1.00   • Years: 40.00   • Pack years: 40.00   • Types: Cigarettes   • Quit date:    • Years since quittin.8   Smokeless Tobacco Never "     Alcohol Consumption:  Social History     Substance and Sexual Activity   Alcohol Use Not Currently     Fall Risk Screen:    LEELAADI Fall Risk Assessment was completed, and patient is at LOW risk for falls.Assessment completed on:11/16/2022    Depression Screen:   PHQ-2/PHQ-9 Depression Screening 11/16/2022   Retired PHQ-9 Total Score -   Retired Total Score -   Little Interest or Pleasure in Doing Things 0-->not at all   Feeling Down, Depressed or Hopeless 0-->not at all   PHQ-9: Brief Depression Severity Measure Score 0       Health Habits and Functional and Cognitive Screening:  Functional & Cognitive Status 11/16/2022   Do you have difficulty preparing food and eating? No   Do you have difficulty bathing yourself, getting dressed or grooming yourself? No   Do you have difficulty using the toilet? No   Do you have difficulty moving around from place to place? No   Current Diet Limited Junk Food   Dental Exam Not up to date   Eye Exam Not up to date   Exercise (times per week) 0 times per week   Current Exercises Include No Regular Exercise   Do you need help using the phone?  No   Are you deaf or do you have serious difficulty hearing?  No   Do you need help with transportation? No   Do you need help shopping? No   Do you need help preparing meals?  No   Do you need help with housework?  No   Do you need help with laundry? No   Do you need help taking your medications? No   Do you need help managing money? No   Do you ever drive or ride in a car without wearing a seat belt? No   Have you felt unusual stress, anger or loneliness in the last month? No   Who do you live with? Spouse   If you need help, do you have trouble finding someone available to you? No   Have you been bothered in the last four weeks by sexual problems? No   Do you have difficulty concentrating, remembering or making decisions? No       Age-appropriate Screening Schedule:  Refer to the list below for future screening recommendations based on  patient's age, sex and/or medical conditions. Orders for these recommended tests are listed in the plan section. The patient has been provided with a written plan.    Health Maintenance   Topic Date Due   • INFLUENZA VACCINE  10/01/2023 (Originally 8/1/2022)   • ZOSTER VACCINE (1 of 2) 11/16/2023 (Originally 6/28/2007)   • TDAP/TD VACCINES (2 - Td or Tdap) 01/01/2023            Assessment & Plan   CMS Preventative Services Quick Reference  Risk Factors Identified During Encounter  Immunizations Discussed/Encouraged (specific Immunizations; Influenza, Prevnar 20 (Pneumococcal 20-valent conjugate), Shingrix and COVID19  Obesity/Overweight   The above risks/problems have been discussed with the patient.  Follow up actions/plans if indicated are seen below in the Assessment/Plan Section.  Pertinent information has been shared with the patient in the After Visit Summary.    Diagnoses and all orders for this visit:    1. Medicare annual wellness visit, subsequent (Primary)  Comments:  diet and exercise, annual wellness, health maintenance recommendations discussed - shingrix vaccine pending insurance coverage    2. Recommendation refused by patient  Comments:  declines flu, covid, pneumonia vaccine     3. Essential (primary) hypertension  Comments:  increase amlodipine to 5mg  follow up in 4 weeks - home monitoring;  diet and lifestyle modifications recommended  Assessment & Plan:  Hypertension is worsening.  Dietary sodium restriction.  Weight loss.  Stop smoking.  Medication changes per orders.  Blood pressure will be reassessed in 4 weeks.    Orders:  -     amLODIPine (NORVASC) 5 MG tablet; Take 1 tablet by mouth Daily.  Dispense: 90 tablet; Refill: 1  -     lisinopril (PRINIVIL,ZESTRIL) 40 MG tablet; Take 1 tablet by mouth Daily.  Dispense: 90 tablet; Refill: 1  -     Lipid panel; Future    4. History of cigarette smoking  -     [place] aaa screen limited; Future  -      CT Chest Low Dose Cancer Screening WO; Future    5.  Positive hepatitis C antibody test  Comments:  discussed need to follow up as previously recommended - we will repeat labs and discussed referral to Hep C clinic - he will get labs repeated with viral levels  Orders:  -     HCV Antibody Rfx To Qnt PCR; Future    6. Screening for colon cancer  -     Cologuard - Stool, Per Rectum; Future    7. Screening for cardiovascular condition  -     Comprehensive metabolic panel; Future      Follow Up:  Return in about 4 weeks (around 12/14/2022) for Recheck.

## 2022-11-16 NOTE — ASSESSMENT & PLAN NOTE
Hypertension is worsening.  Dietary sodium restriction.  Weight loss.  Stop smoking.  Medication changes per orders.  Blood pressure will be reassessed in 4 weeks.

## 2022-11-28 ENCOUNTER — TELEPHONE (OUTPATIENT)
Dept: FAMILY MEDICINE CLINIC | Age: 67
End: 2022-11-28

## 2022-11-28 NOTE — TELEPHONE ENCOUNTER
Spoke to pt about overdue labs on 11/2/22. He stated he will be in sometime this week to complete these/MPG (1st attempt)

## 2022-12-02 ENCOUNTER — LAB (OUTPATIENT)
Dept: LAB | Facility: HOSPITAL | Age: 67
End: 2022-12-02

## 2022-12-02 DIAGNOSIS — I10 ESSENTIAL (PRIMARY) HYPERTENSION: Chronic | ICD-10-CM

## 2022-12-02 DIAGNOSIS — Z13.6 SCREENING FOR CARDIOVASCULAR CONDITION: ICD-10-CM

## 2022-12-02 DIAGNOSIS — R76.8 POSITIVE HEPATITIS C ANTIBODY TEST: ICD-10-CM

## 2022-12-02 LAB
ALBUMIN SERPL-MCNC: 4.5 G/DL (ref 3.5–5.2)
ALBUMIN/GLOB SERPL: 1.9 G/DL
ALP SERPL-CCNC: 65 U/L (ref 39–117)
ALT SERPL W P-5'-P-CCNC: 16 U/L (ref 1–41)
ANION GAP SERPL CALCULATED.3IONS-SCNC: 11.2 MMOL/L (ref 5–15)
AST SERPL-CCNC: 21 U/L (ref 1–40)
BILIRUB SERPL-MCNC: 1.1 MG/DL (ref 0–1.2)
BUN SERPL-MCNC: 17 MG/DL (ref 8–23)
BUN/CREAT SERPL: 15.2 (ref 7–25)
CALCIUM SPEC-SCNC: 10.2 MG/DL (ref 8.6–10.5)
CHLORIDE SERPL-SCNC: 103 MMOL/L (ref 98–107)
CHOLEST SERPL-MCNC: 195 MG/DL (ref 0–200)
CO2 SERPL-SCNC: 25.8 MMOL/L (ref 22–29)
CREAT SERPL-MCNC: 1.12 MG/DL (ref 0.76–1.27)
EGFRCR SERPLBLD CKD-EPI 2021: 72.9 ML/MIN/1.73
GLOBULIN UR ELPH-MCNC: 2.4 GM/DL
GLUCOSE SERPL-MCNC: 92 MG/DL (ref 65–99)
HDLC SERPL-MCNC: 54 MG/DL (ref 40–60)
LDLC SERPL CALC-MCNC: 129 MG/DL (ref 0–100)
LDLC/HDLC SERPL: 2.36 {RATIO}
POTASSIUM SERPL-SCNC: 5.4 MMOL/L (ref 3.5–5.2)
PROT SERPL-MCNC: 6.9 G/DL (ref 6–8.5)
SODIUM SERPL-SCNC: 140 MMOL/L (ref 136–145)
TRIGL SERPL-MCNC: 68 MG/DL (ref 0–150)
VLDLC SERPL-MCNC: 12 MG/DL (ref 5–40)

## 2022-12-02 PROCEDURE — 80061 LIPID PANEL: CPT

## 2022-12-02 PROCEDURE — 80053 COMPREHEN METABOLIC PANEL: CPT

## 2022-12-02 PROCEDURE — 36415 COLL VENOUS BLD VENIPUNCTURE: CPT

## 2022-12-02 PROCEDURE — 86803 HEPATITIS C AB TEST: CPT

## 2022-12-03 LAB
HCV AB S/CO SERPL IA: 0.4 S/CO RATIO (ref 0–0.9)
HCV AB SERPL QL IA: NORMAL

## 2022-12-08 DIAGNOSIS — I10 ESSENTIAL (PRIMARY) HYPERTENSION: Chronic | ICD-10-CM

## 2022-12-08 RX ORDER — AMLODIPINE BESYLATE 2.5 MG/1
TABLET ORAL
Qty: 30 TABLET | Refills: 0 | OUTPATIENT
Start: 2022-12-08

## 2022-12-13 ENCOUNTER — HOSPITAL ENCOUNTER (OUTPATIENT)
Dept: ULTRASOUND IMAGING | Facility: HOSPITAL | Age: 67
Discharge: HOME | End: 2022-12-13

## 2022-12-13 ENCOUNTER — HOSPITAL ENCOUNTER (OUTPATIENT)
Dept: CT IMAGING | Facility: HOSPITAL | Age: 67
Discharge: HOME | End: 2022-12-13

## 2022-12-13 DIAGNOSIS — Z87.891 HISTORY OF CIGARETTE SMOKING: ICD-10-CM

## 2022-12-13 PROCEDURE — 76706 US ABDL AORTA SCREEN AAA: CPT

## 2022-12-13 PROCEDURE — 71271 CT THORAX LUNG CANCER SCR C-: CPT

## 2023-01-07 DIAGNOSIS — I10 ESSENTIAL (PRIMARY) HYPERTENSION: Chronic | ICD-10-CM

## 2023-01-09 RX ORDER — AMLODIPINE BESYLATE 2.5 MG/1
TABLET ORAL
Qty: 30 TABLET | Refills: 0 | OUTPATIENT
Start: 2023-01-09

## 2023-01-23 ENCOUNTER — OFFICE VISIT (OUTPATIENT)
Dept: PULMONOLOGY | Facility: CLINIC | Age: 68
End: 2023-01-23
Payer: MEDICARE

## 2023-01-23 VITALS
WEIGHT: 232 LBS | SYSTOLIC BLOOD PRESSURE: 164 MMHG | TEMPERATURE: 98.2 F | DIASTOLIC BLOOD PRESSURE: 85 MMHG | HEIGHT: 72 IN | HEART RATE: 98 BPM | BODY MASS INDEX: 31.42 KG/M2 | OXYGEN SATURATION: 98 % | RESPIRATION RATE: 18 BRPM

## 2023-01-23 DIAGNOSIS — J41.8 MIXED SIMPLE AND MUCOPURULENT CHRONIC BRONCHITIS: Primary | ICD-10-CM

## 2023-01-23 DIAGNOSIS — R91.1 LUNG NODULE: ICD-10-CM

## 2023-01-23 PROCEDURE — 99203 OFFICE O/P NEW LOW 30 MIN: CPT | Performed by: INTERNAL MEDICINE

## 2023-01-23 NOTE — PROGRESS NOTES
Pulmonary Consultation    Johanny Henry APRN,    Thank you for asking me to see Zaheer Law for   Chief Complaint   Patient presents with   • Establish Care     New Patient- 10mm Lung nodule /Emphysema   .      History of Present Illness  Zaheer Law is a 67 y.o. male with a PMH significant for tobacco abuse presents for evaluation on account of abnormal CT scan patient has quit smoking he complains of cough with mucoid expectoration off and on along with some dyspnea on exertion patient denies any chest pain fever or hemoptysis he denies any weight loss       Tobacco use history:  Former smoker      Review of Systems: History obtained from chart review and the patient.  Review of Systems   Respiratory: Positive for cough and shortness of breath.    All other systems reviewed and are negative.    As described in the HPI. Otherwise, remainder of ROS (14 systems) were negative.    Patient Active Problem List   Diagnosis   • Essential (primary) hypertension         Current Outpatient Medications:   •  amLODIPine (NORVASC) 5 MG tablet, Take 1 tablet by mouth Daily., Disp: 90 tablet, Rfl: 1  •  lisinopril (PRINIVIL,ZESTRIL) 40 MG tablet, Take 1 tablet by mouth Daily., Disp: 90 tablet, Rfl: 1    No Known Allergies    Past Medical History:   Diagnosis Date   • Essential (primary) hypertension    • History of cigarette smoking      History reviewed. No pertinent surgical history.  Social History     Socioeconomic History   • Marital status:    • Number of children: 1   Tobacco Use   • Smoking status: Former     Packs/day: 1.00     Years: 40.00     Pack years: 40.00     Types: Cigarettes     Quit date:      Years since quittin.0   • Smokeless tobacco: Never   Vaping Use   • Vaping Use: Every day   • Substances: Nicotine, Flavoring   • Devices: Pre-filled or refillable cartridge, Refillable tank   Substance and Sexual Activity   • Alcohol use: Not Currently   • Drug use: Never   • Sexual activity: Defer  "    Family History   Problem Relation Age of Onset   • Lung cancer Mother 82        SMOKER   • COPD Mother    • Heart failure Father    • Hypertension Father    • Hypertension Sister    • Lung cancer Maternal Grandmother    • Cirrhosis Maternal Grandfather         R/T ETOH   • Cancer Paternal Grandmother    • Heart attack Paternal Grandfather        CT Chest Low Dose Cancer Screening WO    Result Date: 12/13/2022    1. 10 mm left lower lobe nodule.  Recommend either evaluation with PET-CT or follow-up CT chest in 3 months. 2. No acute cardiopulmonary process. 3. Mild emphysema.      FRANCOIS HURST MD       Electronically Signed and Approved By: FRANCOIS HURST MD on 12/13/2022 at 12:57             [place] aaa screen limited    Result Date: 12/13/2022   No abdominal aortic aneurysm.      LAURY DIMAS MD       Electronically Signed and Approved By: LAURY DIMAS MD on 12/13/2022 at 11:54                   Objective     Blood pressure 164/85, pulse 98, temperature 98.2 °F (36.8 °C), temperature source Tympanic, resp. rate 18, height 182.9 cm (72\"), weight 105 kg (232 lb), SpO2 98 %.  Physical Exam  Vitals and nursing note reviewed.   Constitutional:       Appearance: Normal appearance.   HENT:      Head: Normocephalic and atraumatic.      Nose: Nose normal.      Mouth/Throat:      Mouth: Mucous membranes are moist.      Pharynx: Oropharynx is clear.   Eyes:      Conjunctiva/sclera: Conjunctivae normal.      Pupils: Pupils are equal, round, and reactive to light.   Cardiovascular:      Rate and Rhythm: Normal rate and regular rhythm.      Pulses: Normal pulses.      Heart sounds: Normal heart sounds.   Pulmonary:      Effort: Pulmonary effort is normal.      Breath sounds: Normal breath sounds.   Abdominal:      General: Abdomen is flat. Bowel sounds are normal.      Palpations: Abdomen is soft.   Musculoskeletal:         General: Normal range of motion.      Cervical back: Normal range of motion and neck supple. "   Skin:     General: Skin is warm.      Capillary Refill: Capillary refill takes less than 2 seconds.   Neurological:      General: No focal deficit present.      Mental Status: He is alert and oriented to person, place, and time.   Psychiatric:         Mood and Affect: Mood normal.         Behavior: Behavior normal.       Immunization History   Administered Date(s) Administered   • Tdap 01/01/2013            Assessment & Plan     Diagnoses and all orders for this visit:    1. Mixed simple and mucopurulent chronic bronchitis (HCC) (Primary)  -     NM PET/CT Skull Base to Mid Thigh; Future  -     Full Pulmonary Function Test With Bronchodilator; Future    2. Lung nodule  -     NM PET/CT Skull Base to Mid Thigh; Future  -     Full Pulmonary Function Test With Bronchodilator; Future         Discussion/ Recommendations:   Patient CT scan was reviewed which shows 10 mm nodule in left lung base  Patient has already quit smoking  We will order PFTs and PET scan to evaluate the left lung nodule in view of his smoking history and family history of cancer  Patient is advised to reduce weight and regular exercise  Discussed vaccination and recommended    BMI is >= 30 and <35. (Class 1 Obesity). The following options were offered after discussion;: exercise counseling/recommendations           Return in about 2 months (around 3/23/2023).      Thank you for allowing me to participate in the care of Zaheer Law. Please do not hesitate to contact me with any questions.         This document has been electronically signed by Ishaan Vazquez MD on January 23, 2023 14:26 EST

## 2023-02-01 ENCOUNTER — HOSPITAL ENCOUNTER (OUTPATIENT)
Dept: PET IMAGING | Facility: HOSPITAL | Age: 68
Discharge: HOME | End: 2023-02-01
Payer: MEDICARE

## 2023-02-01 DIAGNOSIS — J41.8 MIXED SIMPLE AND MUCOPURULENT CHRONIC BRONCHITIS: ICD-10-CM

## 2023-02-01 DIAGNOSIS — R91.1 LUNG NODULE: ICD-10-CM

## 2023-02-01 PROCEDURE — 78815 PET IMAGE W/CT SKULL-THIGH: CPT

## 2023-02-01 PROCEDURE — 0 FLUDEOXYGLUCOSE F18 SOLUTION: Performed by: INTERNAL MEDICINE

## 2023-02-01 PROCEDURE — A9552 F18 FDG: HCPCS | Performed by: INTERNAL MEDICINE

## 2023-02-01 RX ADMIN — FLUDEOXYGLUCOSE F18 1 DOSE: 300 INJECTION INTRAVENOUS at 08:17

## 2023-05-19 ENCOUNTER — OFFICE VISIT (OUTPATIENT)
Dept: FAMILY MEDICINE CLINIC | Age: 68
End: 2023-05-19
Payer: MEDICARE

## 2023-05-19 VITALS
SYSTOLIC BLOOD PRESSURE: 161 MMHG | TEMPERATURE: 98.5 F | OXYGEN SATURATION: 100 % | DIASTOLIC BLOOD PRESSURE: 90 MMHG | WEIGHT: 229.8 LBS | BODY MASS INDEX: 31.13 KG/M2 | HEART RATE: 91 BPM | HEIGHT: 72 IN

## 2023-05-19 DIAGNOSIS — E66.09 CLASS 1 OBESITY DUE TO EXCESS CALORIES WITH SERIOUS COMORBIDITY AND BODY MASS INDEX (BMI) OF 31.0 TO 31.9 IN ADULT: ICD-10-CM

## 2023-05-19 DIAGNOSIS — E87.5 HYPERKALEMIA: ICD-10-CM

## 2023-05-19 DIAGNOSIS — I10 ESSENTIAL (PRIMARY) HYPERTENSION: Primary | Chronic | ICD-10-CM

## 2023-05-19 PROCEDURE — 1159F MED LIST DOCD IN RCRD: CPT | Performed by: NURSE PRACTITIONER

## 2023-05-19 PROCEDURE — 3080F DIAST BP >= 90 MM HG: CPT | Performed by: NURSE PRACTITIONER

## 2023-05-19 PROCEDURE — 3077F SYST BP >= 140 MM HG: CPT | Performed by: NURSE PRACTITIONER

## 2023-05-19 PROCEDURE — 1160F RVW MEDS BY RX/DR IN RCRD: CPT | Performed by: NURSE PRACTITIONER

## 2023-05-19 PROCEDURE — 99214 OFFICE O/P EST MOD 30 MIN: CPT | Performed by: NURSE PRACTITIONER

## 2023-05-19 RX ORDER — LISINOPRIL 40 MG/1
40 TABLET ORAL DAILY
Qty: 90 TABLET | Refills: 1 | Status: SHIPPED | OUTPATIENT
Start: 2023-05-19

## 2023-05-19 RX ORDER — AMLODIPINE BESYLATE 10 MG/1
10 TABLET ORAL DAILY
Qty: 90 TABLET | Refills: 1 | Status: SHIPPED | OUTPATIENT
Start: 2023-05-19

## 2023-05-19 NOTE — PROGRESS NOTES
Chief Complaint  Zaheer Law presents to [facility] for Hypertension (6 mo. F/U )      Subjective     History of Present Illness  Zaheer presents today for follow up on Hypertension.    Current medication / treatment includes lisinopril (generic) and amlodipine.    Reported as BP checks at home: home BP readings are in the 130s's/70s's range.    Cardiac symptoms none.  The 10-year ASCVD risk score (Zunilda CORNELL, et al., 2019) is: 20.2%    Values used to calculate the score:      Age: 67 years      Sex: Male      Is Non- : No      Diabetic: No      Tobacco smoker: No      Systolic Blood Pressure: 161 mmHg      Is BP treated: Yes      HDL Cholesterol: 54 mg/dL      Total Cholesterol: 195 mg/dL   He is losing weight intentionally.  We discussed his CVD risk score today and ways to lower this number.  He plans on continuing to make lifestyle modifications with the goal of lowering his medication doses.  He has lost 20lbs and plans to lose quiet a bit more in the upcoming months     Last labs his K+ was elevated.  He reports that he was taking a MVI that contained potassium so he did stop that back in December.          Assessment and Plan       Diagnoses and all orders for this visit:    1. Essential (primary) hypertension (Primary)  Comments:  increase amlodipine to10 mg   3-4 weeks for BP check - home monitoring;  diet and lifestyle modifications recommended - continue efforts   Orders:  -     Comprehensive metabolic panel; Future  -     lisinopril (PRINIVIL,ZESTRIL) 40 MG tablet; Take 1 tablet by mouth Daily.  Dispense: 90 tablet; Refill: 1  -     amLODIPine (NORVASC) 10 MG tablet; Take 1 tablet by mouth Daily.  Dispense: 90 tablet; Refill: 1    2. Hyperkalemia  Comments:  [facility] lab was elevated  will repeat today   consider lisinopril if remains elevated    3. Class 1 obesity due to excess calories with serious comorbidity and body mass index (BMI) of 31.0 to 31.9 in  "adult  Comments:  continue with weight loss efforts to improve chronic conditions including HTN        Follow Up   Return in about 6 months (around 11/19/2023) for Recheck but stop by office for free BP check in 3-4 weeks.      New Medications Ordered This Visit   Medications   • lisinopril (PRINIVIL,ZESTRIL) 40 MG tablet     Sig: Take 1 tablet by mouth Daily.     Dispense:  90 tablet     Refill:  1   • amLODIPine (NORVASC) 10 MG tablet     Sig: Take 1 tablet by mouth Daily.     Dispense:  90 tablet     Refill:  1       Medications Discontinued During This Encounter   Medication Reason   • amLODIPine (NORVASC) 5 MG tablet Dose adjustment   • lisinopril (PRINIVIL,ZESTRIL) 40 MG tablet Reorder            Review of Systems    Objective     Vitals:    05/19/23 0753   BP: 161/90   BP Location: Left arm   Patient Position: Sitting   Cuff Size: Adult   Pulse: 91   Temp: 98.5 °F (36.9 °C)   TempSrc: Oral   SpO2: 100%   Weight: 104 kg (229 lb 12.8 oz)   Height: 182.9 cm (72\")     Body mass index is 31.17 kg/m².     Physical Exam  Vitals reviewed.   Constitutional:       Appearance: Normal appearance. He is obese.   HENT:      Head: Normocephalic.   Eyes:      Pupils: Pupils are equal, round, and reactive to light.   Cardiovascular:      Rate and Rhythm: Normal rate and regular rhythm.      Heart sounds: No murmur heard.  Pulmonary:      Effort: Pulmonary effort is normal.      Breath sounds: Normal breath sounds.   Musculoskeletal:         General: Normal range of motion.   Neurological:      Mental Status: He is alert.   Psychiatric:         Mood and Affect: Mood normal.         Behavior: Behavior normal.            Result Review                       No Known Allergies   Past Medical History:   Diagnosis Date   • Essential (primary) hypertension    • History of cigarette smoking      Current Outpatient Medications   Medication Sig Dispense Refill   • lisinopril (PRINIVIL,ZESTRIL) 40 MG tablet Take 1 tablet by mouth Daily. " 90 tablet 1   • amLODIPine (NORVASC) 10 MG tablet Take 1 tablet by mouth Daily. 90 tablet 1     No current facility-administered medications for this visit.     History reviewed. No pertinent surgical history.   Health Maintenance Due   Topic Date Due   • TDAP/TD VACCINES (2 - Td or Tdap) 01/01/2023      Immunization History   Administered Date(s) Administered   • Tdap 01/01/2013         Part of this note may be an electronic transcription/translation of spoken language to printed   text using the Dragon Dictation System.      Johanny Henry, APRN

## 2023-06-06 ENCOUNTER — TELEPHONE (OUTPATIENT)
Dept: FAMILY MEDICINE CLINIC | Age: 68
End: 2023-06-06
Payer: MEDICARE

## 2023-06-15 ENCOUNTER — TELEPHONE (OUTPATIENT)
Dept: FAMILY MEDICINE CLINIC | Age: 68
End: 2023-06-15

## 2023-11-20 ENCOUNTER — OFFICE VISIT (OUTPATIENT)
Dept: FAMILY MEDICINE CLINIC | Age: 68
End: 2023-11-20
Payer: MEDICARE

## 2023-11-20 ENCOUNTER — LAB (OUTPATIENT)
Dept: LAB | Facility: HOSPITAL | Age: 68
End: 2023-11-20
Payer: MEDICARE

## 2023-11-20 VITALS
HEART RATE: 92 BPM | SYSTOLIC BLOOD PRESSURE: 149 MMHG | BODY MASS INDEX: 32.37 KG/M2 | OXYGEN SATURATION: 98 % | WEIGHT: 239 LBS | HEIGHT: 72 IN | DIASTOLIC BLOOD PRESSURE: 90 MMHG

## 2023-11-20 DIAGNOSIS — Z12.5 SCREENING FOR MALIGNANT NEOPLASM OF PROSTATE: ICD-10-CM

## 2023-11-20 DIAGNOSIS — Z13.6 SCREENING FOR CARDIOVASCULAR CONDITION: ICD-10-CM

## 2023-11-20 DIAGNOSIS — Z87.891 HISTORY OF CIGARETTE SMOKING: ICD-10-CM

## 2023-11-20 DIAGNOSIS — I10 ESSENTIAL (PRIMARY) HYPERTENSION: Chronic | ICD-10-CM

## 2023-11-20 DIAGNOSIS — Z00.00 MEDICARE ANNUAL WELLNESS VISIT, SUBSEQUENT: Primary | ICD-10-CM

## 2023-11-20 DIAGNOSIS — Z28.21 VACCINATION NOT CARRIED OUT BECAUSE OF PATIENT REFUSAL: ICD-10-CM

## 2023-11-20 LAB
ALBUMIN SERPL-MCNC: 4.3 G/DL (ref 3.5–5.2)
ALBUMIN/GLOB SERPL: 1.4 G/DL
ALP SERPL-CCNC: 77 U/L (ref 39–117)
ALT SERPL W P-5'-P-CCNC: 24 U/L (ref 1–41)
ANION GAP SERPL CALCULATED.3IONS-SCNC: 10.3 MMOL/L (ref 5–15)
AST SERPL-CCNC: 24 U/L (ref 1–40)
BILIRUB SERPL-MCNC: 0.9 MG/DL (ref 0–1.2)
BUN SERPL-MCNC: 16 MG/DL (ref 8–23)
BUN/CREAT SERPL: 13.2 (ref 7–25)
CALCIUM SPEC-SCNC: 9.6 MG/DL (ref 8.6–10.5)
CHLORIDE SERPL-SCNC: 103 MMOL/L (ref 98–107)
CHOLEST SERPL-MCNC: 190 MG/DL (ref 0–200)
CO2 SERPL-SCNC: 24.7 MMOL/L (ref 22–29)
CREAT SERPL-MCNC: 1.21 MG/DL (ref 0.76–1.27)
EGFRCR SERPLBLD CKD-EPI 2021: 66 ML/MIN/1.73
GLOBULIN UR ELPH-MCNC: 3.1 GM/DL
GLUCOSE SERPL-MCNC: 98 MG/DL (ref 65–99)
HDLC SERPL-MCNC: 52 MG/DL (ref 40–60)
LDLC SERPL CALC-MCNC: 123 MG/DL (ref 0–100)
LDLC/HDLC SERPL: 2.34 {RATIO}
POTASSIUM SERPL-SCNC: 4.6 MMOL/L (ref 3.5–5.2)
PROT SERPL-MCNC: 7.4 G/DL (ref 6–8.5)
PSA SERPL-MCNC: 3.42 NG/ML (ref 0–4)
SODIUM SERPL-SCNC: 138 MMOL/L (ref 136–145)
TRIGL SERPL-MCNC: 82 MG/DL (ref 0–150)
VLDLC SERPL-MCNC: 15 MG/DL (ref 5–40)

## 2023-11-20 PROCEDURE — 80053 COMPREHEN METABOLIC PANEL: CPT

## 2023-11-20 PROCEDURE — 36415 COLL VENOUS BLD VENIPUNCTURE: CPT

## 2023-11-20 PROCEDURE — 80061 LIPID PANEL: CPT

## 2023-11-20 PROCEDURE — G0103 PSA SCREENING: HCPCS

## 2023-11-20 RX ORDER — AMLODIPINE BESYLATE 10 MG/1
10 TABLET ORAL DAILY
Qty: 90 TABLET | Refills: 1 | Status: SHIPPED | OUTPATIENT
Start: 2023-11-20

## 2023-11-20 RX ORDER — LISINOPRIL 40 MG/1
40 TABLET ORAL DAILY
Qty: 90 TABLET | Refills: 1 | Status: SHIPPED | OUTPATIENT
Start: 2023-11-20

## 2023-11-20 NOTE — PROGRESS NOTES
"The ABCs of the Annual Wellness Visit  Subsequent Medicare Wellness Visit    Subjective    Zaheer Law is a 68 y.o. male who presents for a Subsequent Medicare Wellness Visit.    The following portions of the patient's history were reviewed and   updated as appropriate: allergies, current medications, past family history, past medical history, past social history, past surgical history, and problem list.    Compared to one year ago, the patient feels his physical   health is the same.    Compared to one year ago, the patient feels his mental   health is the same.    Recent Hospitalizations:  He was not admitted to the hospital during the last year.       Current Medical Providers:  Patient Care Team:  Johanny Henry APRN as PCP - General (Nurse Practitioner)    Outpatient Medications Prior to Visit   Medication Sig Dispense Refill    amLODIPine (NORVASC) 10 MG tablet Take 1 tablet by mouth Daily. 90 tablet 1    lisinopril (PRINIVIL,ZESTRIL) 40 MG tablet Take 1 tablet by mouth Daily. 90 tablet 1     No facility-administered medications prior to visit.       No opioid medication identified on active medication list. I have reviewed chart for other potential  high risk medication/s and harmful drug interactions in the elderly.        Aspirin is not on active medication list.  Aspirin use is not indicated based on review of current medical condition/s. Risk of harm outweighs potential benefits.  .    Patient Active Problem List   Diagnosis    Essential (primary) hypertension     Advance Care Planning   Advance Care Planning     Advance Directive is not on file.  ACP discussion was held with the patient during this visit. Patient does not have an advance directive, information provided.     Objective    Vitals:    11/20/23 0804   BP: 149/90   BP Location: Right arm   Patient Position: Sitting   Pulse: 92   SpO2: 98%   Weight: 108 kg (239 lb)   Height: 182.9 cm (72.01\")     Estimated body mass index is 32.41 kg/m² as " "calculated from the following:    Height as of this encounter: 182.9 cm (72.01\").    Weight as of this encounter: 108 kg (239 lb).           Does the patient have evidence of cognitive impairment? No          HEALTH RISK ASSESSMENT    Smoking Status:  Social History     Tobacco Use   Smoking Status Former    Packs/day: 1.00    Years: 40.00    Additional pack years: 0.00    Total pack years: 40.00    Types: Cigarettes    Quit date:     Years since quittin.8   Smokeless Tobacco Never     Alcohol Consumption:  Social History     Substance and Sexual Activity   Alcohol Use Not Currently     Fall Risk Screen:    STEADI Fall Risk Assessment was completed, and patient is at LOW risk for falls.Assessment completed on:2023    Depression Screenin/20/2023     8:07 AM   PHQ-2/PHQ-9 Depression Screening   Little Interest or Pleasure in Doing Things 0-->not at all   Feeling Down, Depressed or Hopeless 0-->not at all   PHQ-9: Brief Depression Severity Measure Score 0       Health Habits and Functional and Cognitive Screenin/20/2023     8:07 AM   Functional & Cognitive Status   Do you have difficulty preparing food and eating? No   Do you have difficulty bathing yourself, getting dressed or grooming yourself? No   Do you have difficulty using the toilet? No   Do you have difficulty moving around from place to place? No   Do you have trouble with steps or getting out of a bed or a chair? No   Current Diet Unhealthy Diet   Dental Exam Up to date   Eye Exam Up to date   Exercise (times per week) 0 times per week   Current Exercises Include No Regular Exercise   Do you need help using the phone?  No   Are you deaf or do you have serious difficulty hearing?  No   Do you need help to go to places out of walking distance? No   Do you need help shopping? No   Do you need help preparing meals?  No   Do you need help with housework?  No   Do you need help with laundry? No   Do you need help taking your " medications? No   Do you need help managing money? No   Do you ever drive or ride in a car without wearing a seat belt? No   Have you felt unusual stress, anger or loneliness in the last month? No   Who do you live with? Spouse   If you need help, do you have trouble finding someone available to you? No   Have you been bothered in the last four weeks by sexual problems? No   Do you have difficulty concentrating, remembering or making decisions? No       Age-appropriate Screening Schedule:  Refer to the list below for future screening recommendations based on patient's age, sex and/or medical conditions. Orders for these recommended tests are listed in the plan section. The patient has been provided with a written plan.    Health Maintenance   Topic Date Due    ZOSTER VACCINE (1 of 2) 11/20/2023 (Originally 6/28/2007)    COVID-19 Vaccine (1) 11/22/2023 (Originally 1955)    INFLUENZA VACCINE  03/31/2024 (Originally 8/1/2023)    Pneumococcal Vaccine 65+ (1 - PCV) 11/20/2024 (Originally 6/28/1961)    TDAP/TD VACCINES (2 - Td or Tdap) 11/20/2024 (Originally 1/1/2023)    LUNG CANCER SCREENING  12/13/2023    BMI FOLLOWUP  01/23/2024    ANNUAL WELLNESS VISIT  11/20/2024    COLORECTAL CANCER SCREENING  12/12/2025    HEPATITIS C SCREENING  Completed    AAA SCREEN (ONE-TIME)  Completed                  CMS Preventative Services Quick Reference  Risk Factors Identified During Encounter  Immunizations Discussed/Encouraged: Tdap, Influenza, Prevnar 20 (Pneumococcal 20-valent conjugate), Shingrix, and COVID19  Dental Screening Recommended  Vision Screening Recommended  The above risks/problems have been discussed with the patient.  Pertinent information has been shared with the patient in the After Visit Summary.  An After Visit Summary and PPPS were made available to the patient.    Follow Up:   Next Medicare Wellness visit to be scheduled in 1 year.       Additional E&M Note during same encounter follows:  Patient has  "multiple medical problems which are significant and separately identifiable that require additional work above and beyond the Medicare Wellness Visit.      Chief Complaint  Medicare Wellness-subsequent    Subjective        HPI  Zaheercecy Law is also being seen today for Hypertension    Zaheer presents today for follow up on Hypertension.    Current medication / treatment includes lisinopril (generic) and amlodipine.    Reported as home BP checks: 130s/70-80s  Cardiac symptoms none.  The 10-year CVD risk score (SHERIDAN'Maxime, et al., 2008) is: 28.5%    Values used to calculate the score:      Age: 68 years      Sex: Male      Diabetic: No      Tobacco smoker: No      Systolic Blood Pressure: 149 mmHg      Is BP treated: Yes      HDL Cholesterol: 54 mg/dL      Total Cholesterol: 195 mg/dL    His BP is improved from previous.  He states that his BP is borderline at home and on higher end normal sometimes up into  the 140s       Objective   Vital Signs:  /90 (BP Location: Right arm, Patient Position: Sitting)   Pulse 92   Ht 182.9 cm (72.01\")   Wt 108 kg (239 lb)   SpO2 98%   BMI 32.41 kg/m²     Physical Exam  Vitals reviewed.   Constitutional:       Appearance: Normal appearance. He is obese.   HENT:      Head: Normocephalic.   Eyes:      Pupils: Pupils are equal, round, and reactive to light.   Cardiovascular:      Rate and Rhythm: Normal rate and regular rhythm.      Heart sounds: No murmur heard.  Pulmonary:      Effort: Pulmonary effort is normal.      Breath sounds: Normal breath sounds.   Musculoskeletal:         General: Normal range of motion.   Neurological:      Mental Status: He is alert.   Psychiatric:         Mood and Affect: Mood normal.         Behavior: Behavior normal.                         Assessment and Plan   Diagnoses and all orders for this visit:    1. Medicare annual wellness visit, subsequent (Primary)    2. History of cigarette smoking  -      CT Chest Low Dose Cancer Screening WO; " Future    3. Essential (primary) hypertension  Comments:  continue current treatment  if remains elevated at next visit, discussed need to add on additional medications as we are max dose of current treatment  Orders:  -     amLODIPine (NORVASC) 10 MG tablet; Take 1 tablet by mouth Daily.  Dispense: 90 tablet; Refill: 1  -     lisinopril (PRINIVIL,ZESTRIL) 40 MG tablet; Take 1 tablet by mouth Daily.  Dispense: 90 tablet; Refill: 1    4. Vaccination not carried out because of patient refusal  Comments:  declines vaccines as recommended    5. Screening for cardiovascular condition  -     Comprehensive metabolic panel; Future  -     Lipid panel; Future    6. Screening for malignant neoplasm of prostate  -     PSA SCREENING; Future             Follow Up   Return in about 6 months (around 5/20/2024) for Recheck.  Patient was given instructions and counseling regarding his condition or for health maintenance advice. Please see specific information pulled into the AVS if appropriate.

## 2023-12-18 ENCOUNTER — HOSPITAL ENCOUNTER (OUTPATIENT)
Dept: CT IMAGING | Facility: HOSPITAL | Age: 68
Discharge: HOME | End: 2023-12-18
Admitting: NURSE PRACTITIONER
Payer: MEDICARE

## 2023-12-18 DIAGNOSIS — Z87.891 HISTORY OF CIGARETTE SMOKING: ICD-10-CM

## 2023-12-18 PROCEDURE — 71271 CT THORAX LUNG CANCER SCR C-: CPT

## 2024-05-20 ENCOUNTER — OFFICE VISIT (OUTPATIENT)
Dept: FAMILY MEDICINE CLINIC | Age: 69
End: 2024-05-20
Payer: MEDICARE

## 2024-05-20 ENCOUNTER — LAB (OUTPATIENT)
Dept: LAB | Facility: HOSPITAL | Age: 69
End: 2024-05-20
Payer: MEDICARE

## 2024-05-20 VITALS
HEART RATE: 78 BPM | HEIGHT: 72 IN | OXYGEN SATURATION: 100 % | TEMPERATURE: 98 F | DIASTOLIC BLOOD PRESSURE: 78 MMHG | BODY MASS INDEX: 30.42 KG/M2 | SYSTOLIC BLOOD PRESSURE: 138 MMHG | WEIGHT: 224.6 LBS

## 2024-05-20 DIAGNOSIS — Z91.89 AT HIGH RISK FOR CARDIOVASCULAR DISEASE: ICD-10-CM

## 2024-05-20 DIAGNOSIS — I10 ESSENTIAL (PRIMARY) HYPERTENSION: Primary | Chronic | ICD-10-CM

## 2024-05-20 DIAGNOSIS — Z13.6 SCREENING FOR CARDIOVASCULAR CONDITION: ICD-10-CM

## 2024-05-20 DIAGNOSIS — I10 ESSENTIAL (PRIMARY) HYPERTENSION: Chronic | ICD-10-CM

## 2024-05-20 DIAGNOSIS — E66.09 CLASS 1 OBESITY DUE TO EXCESS CALORIES WITH SERIOUS COMORBIDITY AND BODY MASS INDEX (BMI) OF 30.0 TO 30.9 IN ADULT: ICD-10-CM

## 2024-05-20 PROBLEM — E66.811 CLASS 1 OBESITY DUE TO EXCESS CALORIES WITH SERIOUS COMORBIDITY AND BODY MASS INDEX (BMI) OF 30.0 TO 30.9 IN ADULT: Status: ACTIVE | Noted: 2024-05-20

## 2024-05-20 LAB
ALBUMIN SERPL-MCNC: 4.3 G/DL (ref 3.5–5.2)
ALBUMIN/GLOB SERPL: 1.6 G/DL
ALP SERPL-CCNC: 78 U/L (ref 39–117)
ALT SERPL W P-5'-P-CCNC: 13 U/L (ref 1–41)
ANION GAP SERPL CALCULATED.3IONS-SCNC: 9.5 MMOL/L (ref 5–15)
AST SERPL-CCNC: 17 U/L (ref 1–40)
BILIRUB SERPL-MCNC: 0.9 MG/DL (ref 0–1.2)
BUN SERPL-MCNC: 17 MG/DL (ref 8–23)
BUN/CREAT SERPL: 14.9 (ref 7–25)
CALCIUM SPEC-SCNC: 9.5 MG/DL (ref 8.6–10.5)
CHLORIDE SERPL-SCNC: 104 MMOL/L (ref 98–107)
CHOLEST SERPL-MCNC: 192 MG/DL (ref 0–200)
CO2 SERPL-SCNC: 26.5 MMOL/L (ref 22–29)
CREAT SERPL-MCNC: 1.14 MG/DL (ref 0.76–1.27)
EGFRCR SERPLBLD CKD-EPI 2021: 70.9 ML/MIN/1.73
GLOBULIN UR ELPH-MCNC: 2.7 GM/DL
GLUCOSE SERPL-MCNC: 100 MG/DL (ref 65–99)
HDLC SERPL-MCNC: 63 MG/DL (ref 40–60)
LDLC SERPL CALC-MCNC: 117 MG/DL (ref 0–100)
LDLC/HDLC SERPL: 1.84 {RATIO}
POTASSIUM SERPL-SCNC: 5 MMOL/L (ref 3.5–5.2)
PROT SERPL-MCNC: 7 G/DL (ref 6–8.5)
SODIUM SERPL-SCNC: 140 MMOL/L (ref 136–145)
TRIGL SERPL-MCNC: 64 MG/DL (ref 0–150)
VLDLC SERPL-MCNC: 12 MG/DL (ref 5–40)

## 2024-05-20 PROCEDURE — 99214 OFFICE O/P EST MOD 30 MIN: CPT | Performed by: NURSE PRACTITIONER

## 2024-05-20 PROCEDURE — 36415 COLL VENOUS BLD VENIPUNCTURE: CPT

## 2024-05-20 PROCEDURE — 80053 COMPREHEN METABOLIC PANEL: CPT

## 2024-05-20 PROCEDURE — 1126F AMNT PAIN NOTED NONE PRSNT: CPT | Performed by: NURSE PRACTITIONER

## 2024-05-20 PROCEDURE — G2211 COMPLEX E/M VISIT ADD ON: HCPCS | Performed by: NURSE PRACTITIONER

## 2024-05-20 PROCEDURE — 1159F MED LIST DOCD IN RCRD: CPT | Performed by: NURSE PRACTITIONER

## 2024-05-20 PROCEDURE — 1160F RVW MEDS BY RX/DR IN RCRD: CPT | Performed by: NURSE PRACTITIONER

## 2024-05-20 PROCEDURE — 80061 LIPID PANEL: CPT

## 2024-05-20 PROCEDURE — 3078F DIAST BP <80 MM HG: CPT | Performed by: NURSE PRACTITIONER

## 2024-05-20 PROCEDURE — 3075F SYST BP GE 130 - 139MM HG: CPT | Performed by: NURSE PRACTITIONER

## 2024-05-20 RX ORDER — LISINOPRIL 40 MG/1
40 TABLET ORAL DAILY
Qty: 90 TABLET | Refills: 1 | Status: SHIPPED | OUTPATIENT
Start: 2024-05-20

## 2024-05-20 RX ORDER — AMLODIPINE BESYLATE 10 MG/1
10 TABLET ORAL DAILY
Qty: 90 TABLET | Refills: 1 | Status: SHIPPED | OUTPATIENT
Start: 2024-05-20

## 2024-05-20 NOTE — PROGRESS NOTES
Chief Complaint  Zaheer Law presents to [facility] for Hypertension (6 mo. F/U )      Subjective     History of Present Illness    Zaheer presents today for follow up on Hypertension.    Current medication / treatment includes lisinopril (generic) and amlodipine, and is compliant with medications.   no side effects reported.    Home blood pressure monitoring readings reported as home BP checks: It is well controlled when checked. and home BP readings are in the 120's/70's range  Medication changes are recommended at this time.  The 10-year ASCVD risk score (Zunilda CORNELL, et al., 2019) is: 17%    Values used to calculate the score:      Age: 68 years      Sex: Male      Is Non- : No      Diabetic: No      Tobacco smoker: No      Systolic Blood Pressure: 138 mmHg      Is BP treated: Yes      HDL Cholesterol: 52 mg/dL      Total Cholesterol: 190 mg/dL    His CVD risk score has improved (28.5% at last visit in November)  we will continue to try to maximize risk reduction . He declines statin therapy after discussion that this would be to reduce his risk.  He wishes to continue lifestyle modifications -(he has lost 15 lbs since his last visit in November)    CT chest showed emphysema - no difficulty with breathing today  Declines additional tx    Assessment and Plan       Diagnoses and all orders for this visit:    1. Essential (primary) hypertension (Primary)  Comments:  improved control today  continue current treatment and follow up 6 months  - [facility]  continue to monitor at home  Orders:  -     amLODIPine (NORVASC) 10 MG tablet; Take 1 tablet by mouth Daily.  Dispense: 90 tablet; Refill: 1  -     lisinopril (PRINIVIL,ZESTRIL) 40 MG tablet; Take 1 tablet by mouth Daily.  Dispense: 90 tablet; Refill: 1  -     Comprehensive metabolic panel; Future  -     Lipid panel; Future    2. At high risk for cardiovascular disease  Comments:  declines statin therapy  discussed CVD  "risk reduction - continue with lifestyle modifications    3. Screening for cardiovascular condition  -     Comprehensive metabolic panel; Future  -     Lipid panel; Future    4. Class 1 obesity due to excess calories with serious comorbidity and body mass index (BMI) of 30.0 to 30.9 in adult  Comments:  continue with successful weight loss efforts            Follow Up   Return in about 6 months (around 11/20/2024) for Recheck, Medicare Wellness.  Future Appointments   Date Time Provider Department Center   11/25/2024  8:15 AM Johanny Henry APRN [facility]       New Medications Ordered This Visit   Medications    amLODIPine (NORVASC) 10 MG tablet     Sig: Take 1 tablet by mouth Daily.     Dispense:  90 tablet     Refill:  1    lisinopril (PRINIVIL,ZESTRIL) 40 MG tablet     Sig: Take 1 tablet by mouth Daily.     Dispense:  90 tablet     Refill:  1       Medications Discontinued During This Encounter   Medication Reason    amLODIPine (NORVASC) 10 MG tablet Reorder    lisinopril (PRINIVIL,ZESTRIL) 40 MG tablet Reorder          Review of Systems    Objective     Vitals:    05/20/24 0847 05/20/24 0909   BP: 144/78 138/78   BP Location: Left arm Right arm   Patient Position: Sitting    Cuff Size: Adult    Pulse: 78    Temp: 98 °F (36.7 °C)    TempSrc: Temporal    SpO2: 100%    Weight: 102 kg (224 lb 9.6 oz)    Height: 182.9 cm (72.01\")      Body mass index is 30.45 kg/m².   BMI is >= 30 and <35. (Class 1 Obesity). The following options were offered after discussion;: continue with successful weight loss efforts       Physical Exam  Vitals reviewed.   Constitutional:       Appearance: Normal appearance.   HENT:      Head: Normocephalic.   Eyes:      Pupils: Pupils are equal, round, and reactive to light.   Cardiovascular:      Rate and Rhythm: Normal rate and regular rhythm.      Heart sounds: No murmur heard.  Pulmonary:      Effort: Pulmonary effort is normal.      Breath sounds: Normal breath sounds. "   Musculoskeletal:         General: Normal range of motion.   Neurological:      Mental Status: He is alert.   Psychiatric:         Mood and Affect: Mood normal.         Behavior: Behavior normal.            Result Review               No Known Allergies   Past Medical History:   Diagnosis Date    Essential (primary) hypertension     History of cigarette smoking      Current Outpatient Medications   Medication Sig Dispense Refill    amLODIPine (NORVASC) 10 MG tablet Take 1 tablet by mouth Daily. 90 tablet 1    lisinopril (PRINIVIL,ZESTRIL) 40 MG tablet Take 1 tablet by mouth Daily. 90 tablet 1     No current facility-administered medications for this visit.     History reviewed. No pertinent surgical history.   Health Maintenance Due   Topic Date Due    ZOSTER VACCINE (1 of 2) Never done    BMI FOLLOWUP  01/23/2024      Immunization History   Administered Date(s) Administered    Tdap 01/01/2013         Part of this note may be an electronic transcription/translation of spoken language to printed   text using the Dragon Dictation System.      TESS Cruz

## 2024-11-12 DIAGNOSIS — I10 ESSENTIAL (PRIMARY) HYPERTENSION: Chronic | ICD-10-CM

## 2024-11-12 RX ORDER — LISINOPRIL 40 MG/1
40 TABLET ORAL DAILY
Qty: 90 TABLET | Refills: 0 | Status: SHIPPED | OUTPATIENT
Start: 2024-11-12

## 2024-11-12 RX ORDER — AMLODIPINE BESYLATE 10 MG/1
10 TABLET ORAL DAILY
Qty: 90 TABLET | Refills: 0 | Status: SHIPPED | OUTPATIENT
Start: 2024-11-12

## 2024-11-25 ENCOUNTER — OFFICE VISIT (OUTPATIENT)
Dept: FAMILY MEDICINE CLINIC | Age: 69
End: 2024-11-25
Payer: MEDICARE

## 2024-11-25 VITALS
DIASTOLIC BLOOD PRESSURE: 80 MMHG | HEIGHT: 72 IN | TEMPERATURE: 98.2 F | SYSTOLIC BLOOD PRESSURE: 151 MMHG | OXYGEN SATURATION: 100 % | WEIGHT: 239.6 LBS | BODY MASS INDEX: 32.45 KG/M2 | HEART RATE: 89 BPM

## 2024-11-25 DIAGNOSIS — Z00.00 MEDICARE ANNUAL WELLNESS VISIT, SUBSEQUENT: Primary | ICD-10-CM

## 2024-11-25 DIAGNOSIS — E66.811 CLASS 1 OBESITY DUE TO EXCESS CALORIES WITH SERIOUS COMORBIDITY AND BODY MASS INDEX (BMI) OF 32.0 TO 32.9 IN ADULT: ICD-10-CM

## 2024-11-25 DIAGNOSIS — Z12.2 ENCOUNTER FOR SCREENING FOR LUNG CANCER: ICD-10-CM

## 2024-11-25 DIAGNOSIS — I10 ESSENTIAL (PRIMARY) HYPERTENSION: Chronic | ICD-10-CM

## 2024-11-25 DIAGNOSIS — Z87.891 HISTORY OF CIGARETTE SMOKING: ICD-10-CM

## 2024-11-25 DIAGNOSIS — Z12.5 SCREENING FOR MALIGNANT NEOPLASM OF PROSTATE: ICD-10-CM

## 2024-11-25 DIAGNOSIS — Z28.21 VACCINATION NOT CARRIED OUT BECAUSE OF PATIENT REFUSAL: ICD-10-CM

## 2024-11-25 DIAGNOSIS — Z13.6 SCREENING FOR CARDIOVASCULAR CONDITION: ICD-10-CM

## 2024-11-25 DIAGNOSIS — Z91.89 AT HIGH RISK FOR CARDIOVASCULAR DISEASE: ICD-10-CM

## 2024-11-25 DIAGNOSIS — E66.09 CLASS 1 OBESITY DUE TO EXCESS CALORIES WITH SERIOUS COMORBIDITY AND BODY MASS INDEX (BMI) OF 32.0 TO 32.9 IN ADULT: ICD-10-CM

## 2024-11-25 PROCEDURE — 1126F AMNT PAIN NOTED NONE PRSNT: CPT | Performed by: NURSE PRACTITIONER

## 2024-11-25 PROCEDURE — 3079F DIAST BP 80-89 MM HG: CPT | Performed by: NURSE PRACTITIONER

## 2024-11-25 PROCEDURE — 1159F MED LIST DOCD IN RCRD: CPT | Performed by: NURSE PRACTITIONER

## 2024-11-25 PROCEDURE — 1170F FXNL STATUS ASSESSED: CPT | Performed by: NURSE PRACTITIONER

## 2024-11-25 PROCEDURE — 99214 OFFICE O/P EST MOD 30 MIN: CPT | Performed by: NURSE PRACTITIONER

## 2024-11-25 PROCEDURE — 3077F SYST BP >= 140 MM HG: CPT | Performed by: NURSE PRACTITIONER

## 2024-11-25 PROCEDURE — 1160F RVW MEDS BY RX/DR IN RCRD: CPT | Performed by: NURSE PRACTITIONER

## 2024-11-25 PROCEDURE — G0439 PPPS, SUBSEQ VISIT: HCPCS | Performed by: NURSE PRACTITIONER

## 2024-11-25 RX ORDER — LISINOPRIL 40 MG/1
40 TABLET ORAL DAILY
Qty: 90 TABLET | Refills: 1 | Status: SHIPPED | OUTPATIENT
Start: 2024-11-25

## 2024-11-25 RX ORDER — AMLODIPINE BESYLATE 10 MG/1
10 TABLET ORAL DAILY
Qty: 90 TABLET | Refills: 1 | Status: SHIPPED | OUTPATIENT
Start: 2024-11-25

## 2024-11-25 NOTE — PROGRESS NOTES
"The ABCs of the Annual Wellness Visit  Subsequent Medicare Wellness Visit    Subjective    Zaheer Ani is a 69 y.o. patient who presents for a Subsequent Medicare Wellness Visit.    The following portions of the patient's history were reviewed and updated as appropriate: allergies, current medications, past family history, past medical history, past social history, past surgical history, and problem list.    Compared to one year ago, the patient feels his physical health is the same.    Compared to one year ago, the patient feels his mental health is the same.    Recent Hospitalizations:  He was not admitted to the hospital during the last year.     Current Medical Providers:  Patient Care Team:  Johanny Henry APRN as PCP - General (Nurse Practitioner)    No opioid medication identified on active medication list. I have reviewed chart for other potential  high risk medication/s and harmful drug interactions in the elderly.        Aspirin is not on active medication list.  Aspirin use is not indicated based on review of current medical condition/s. Risk of harm outweighs potential benefits.  .      Patient Active Problem List   Diagnosis    Essential (primary) hypertension    Class 1 obesity due to excess calories with serious comorbidity and body mass index (BMI) of 30.0 to 30.9 in adult    At high risk for cardiovascular disease       Advance Care Planning  Advance Directive is not on file.  ACP discussion was held with the patient during this visit. Patient does not have an advance directive, information provided.    Objective    Vitals:    11/25/24 0808   BP: 151/80   BP Location: Right arm   Patient Position: Sitting   Cuff Size: Large Adult   Pulse: 89   Temp: 98.2 °F (36.8 °C)   TempSrc: Oral   SpO2: 100%   Weight: 109 kg (239 lb 9.6 oz)   Height: 182.9 cm (72.01\")   PainSc: 0-No pain     Estimated body mass index is 32.49 kg/m² as calculated from the following:    Height as of this encounter: 182.9 cm " "(72.01\").    Weight as of this encounter: 109 kg (239 lb 9.6 oz).    BMI is >= 30 and <35. (Class 1 Obesity). The following options were offered after discussion;: weight loss educational material (shared in after visit summary), exercise counseling/recommendations, and nutrition counseling/recommendations      Does the patient have evidence of cognitive impairment? No          HEALTH RISK ASSESSMENT    Smoking Status:  Social History     Tobacco Use   Smoking Status Former    Current packs/day: 0.00    Average packs/day: 1 pack/day for 40.0 years (40.0 ttl pk-yrs)    Types: Cigarettes    Start date:     Quit date:     Years since quittin.9    Passive exposure: Past   Smokeless Tobacco Never     Alcohol Consumption:  Social History     Substance and Sexual Activity   Alcohol Use Not Currently     Fall Risk Screen:    NANCY Fall Risk Assessment was completed, and patient is at LOW risk for falls.Assessment completed on:2024    Depression Screenin/25/2024     8:14 AM   PHQ-2/PHQ-9 Depression Screening   Little interest or pleasure in doing things Not at all   Feeling down, depressed, or hopeless Not at all       Health Habits and Functional and Cognitive Screenin/25/2024     8:14 AM   Functional & Cognitive Status   Do you have difficulty preparing food and eating? No   Do you have difficulty bathing yourself, getting dressed or grooming yourself? No   Do you have difficulty using the toilet? No   Do you have difficulty moving around from place to place? No   Do you have trouble with steps or getting out of a bed or a chair? No   Current Diet Well Balanced Diet   Dental Exam Not up to date   Eye Exam Up to date   Exercise (times per week) 0 times per week   Current Exercises Include No Regular Exercise   Do you need help using the phone?  No   Are you deaf or do you have serious difficulty hearing?  No   Do you need help to go to places out of walking distance? No   Do you need " help shopping? No   Do you need help preparing meals?  No   Do you need help with housework?  No   Do you need help with laundry? No   Do you need help taking your medications? No   Do you need help managing money? No   Do you ever drive or ride in a car without wearing a seat belt? No   Have you felt unusual stress, anger or loneliness in the last month? No   Who do you live with? Spouse   If you need help, do you have trouble finding someone available to you? No   Have you been bothered in the last four weeks by sexual problems? No   Do you have difficulty concentrating, remembering or making decisions? No       Age-appropriate Screening Schedule:  Refer to the list below for future screening recommendations based on patient's age, sex and/or medical conditions. Orders for these recommended tests are listed in the plan section. The patient has been provided with a written plan.    Health Maintenance   Topic Date Due    ZOSTER VACCINE (1 of 2) Never done    Pneumococcal Vaccine 65+ (1 of 1 - PCV) Never done    TDAP/TD VACCINES (2 - Td or Tdap) 01/01/2023    INFLUENZA VACCINE  Never done    COVID-19 Vaccine (1 - 2024-25 season) Never done    LUNG CANCER SCREENING  12/18/2024    ANNUAL WELLNESS VISIT  11/25/2025    BMI FOLLOWUP  11/25/2025    COLORECTAL CANCER SCREENING  12/12/2025    HEPATITIS C SCREENING  Completed    AAA SCREEN (ONE-TIME)  Completed                CMS Preventative Services Quick Reference  Risk Factors Identified During Encounter  Immunizations Discussed/Encouraged: Tdap, Influenza, Prevnar 20 (Pneumococcal 20-valent conjugate), Shingrix, and COVID19  Dental Screening Recommended  The above risks/problems have been discussed with the patient.  Pertinent information has been shared with the patient in the After Visit Summary.  An After Visit Summary and PPPS were made available to the patient.    Follow Up:   Next Medicare Wellness visit to be scheduled in 1 year.     Additional E&M Note during  same encounter follows:  Patient has multiple medical problems which are significant and separately identifiable that require additional work above and beyond the Medicare Wellness Visit.      Chief Complaint:    Chief Complaint   Patient presents with    Medicare Wellness-subsequent    and chronic medical condition follow up       Subjective    History of Present Illness:  In addition to the Medicare wellness exam,     Zaheer presents today for follow up on hypertension.    Current medication treatment includes Lisinopril 40 mg and amlodipine 10 mg, and is most times compliant with medications.   Side effects reported :  No  Home blood pressure monitoring readings reported as home BP checks: It is well controlled when checked.  Medication changes are recommended at this time and patient is not willing to make changes to current treatment plan.  He has gained weight back since our last visit   15 # since May.  He does want to try to make lifestyle modifications and not add an additional medication.    He did have previous nodules noted on CT screening for lung cancer.  He was referred to pulmonary and advised to follow up after PFTs and PET which was performed 2/2023 and found to have no abnormal uptake  -they did recommend repeat CT in 3-6 months which was done in 12/2023 and found to have stable lefet lower lob nodule.  Appeared benign.  He did not follow up with pulmonary as advised      The 10-year ASCVD risk score (Zunilda CORNELL, et al., 2019) is: 19.4%    Values used to calculate the score:      Age: 69 years      Sex: Male      Is Non- : No      Diabetic: No      Tobacco smoker: No      Systolic Blood Pressure: 151 mmHg      Is BP treated: Yes      HDL Cholesterol: 63 mg/dL      Total Cholesterol: 192 mg/dL        Review of Systems:  Review of Systems     Objective   Vital Signs:  /80 (BP Location: Right arm, Patient Position: Sitting, Cuff Size: Large Adult)   Pulse 89   Temp 98.2  "°F (36.8 °C) (Oral)   Ht 182.9 cm (72.01\")   Wt 109 kg (239 lb 9.6 oz)   SpO2 100%   BMI 32.49 kg/m²     Physical Exam  The following data was reviewed by TESS Cruz on 11/25/2024.    Outpatient Medications Prior to Visit   Medication Sig Dispense Refill    amLODIPine (NORVASC) 10 MG tablet Take 1 tablet by mouth once daily 90 tablet 0    lisinopril (PRINIVIL,ZESTRIL) 40 MG tablet Take 1 tablet by mouth once daily 90 tablet 0     No facility-administered medications prior to visit.          Assessment and Plan:       Diagnoses and all orders for this visit:    1. Medicare annual wellness visit, subsequent (Primary)    2. Essential (primary) hypertension  Comments:  does not wish to make changes at this time, will continue to try lifestyle modification  Orders:  -     Comprehensive metabolic panel; Future  -     Lipid panel; Future  -     amLODIPine (NORVASC) 10 MG tablet; Take 1 tablet by mouth Daily.  Dispense: 90 tablet; Refill: 1  -     lisinopril (PRINIVIL,ZESTRIL) 40 MG tablet; Take 1 tablet by mouth Daily.  Dispense: 90 tablet; Refill: 1    3. Vaccination not carried out because of patient refusal    4. Encounter for screening for lung cancer  -      CT Chest Low Dose Cancer Screening WO; Future    5. Screening for cardiovascular condition  -     Comprehensive metabolic panel; Future  -     Lipid panel; Future    6. Screening for malignant neoplasm of prostate  -     PSA SCREENING; Future    7. Class 1 obesity due to excess calories with serious comorbidity and body mass index (BMI) of 32.0 to 32.9 in adult  Comments:  weight gain of 15 # in 6 months, advise weight loss may improve chronic conditions including HTN, HLD    8. At high risk for cardiovascular disease    9. History of cigarette smoking  -      CT Chest Low Dose Cancer Screening WO; Future                 Follow Up  Return in about 6 months (around 5/25/2025) for Recheck.  Patient was given instructions and counseling regarding his " condition or for health maintenance advice. Please see specific information pulled into the AVS if appropriate.

## 2025-06-02 ENCOUNTER — OFFICE VISIT (OUTPATIENT)
Dept: FAMILY MEDICINE CLINIC | Age: 70
End: 2025-06-02
Payer: MEDICARE

## 2025-06-02 ENCOUNTER — LAB (OUTPATIENT)
Dept: LAB | Facility: HOSPITAL | Age: 70
End: 2025-06-02
Payer: MEDICARE

## 2025-06-02 VITALS
HEART RATE: 88 BPM | WEIGHT: 234.2 LBS | DIASTOLIC BLOOD PRESSURE: 95 MMHG | OXYGEN SATURATION: 100 % | BODY MASS INDEX: 31.72 KG/M2 | SYSTOLIC BLOOD PRESSURE: 145 MMHG | HEIGHT: 72 IN | TEMPERATURE: 97.8 F

## 2025-06-02 DIAGNOSIS — E66.09 CLASS 1 OBESITY DUE TO EXCESS CALORIES WITH SERIOUS COMORBIDITY AND BODY MASS INDEX (BMI) OF 31.0 TO 31.9 IN ADULT: ICD-10-CM

## 2025-06-02 DIAGNOSIS — I10 ESSENTIAL (PRIMARY) HYPERTENSION: Chronic | ICD-10-CM

## 2025-06-02 DIAGNOSIS — I10 ESSENTIAL (PRIMARY) HYPERTENSION: Primary | Chronic | ICD-10-CM

## 2025-06-02 DIAGNOSIS — Z13.6 SCREENING FOR CARDIOVASCULAR CONDITION: ICD-10-CM

## 2025-06-02 DIAGNOSIS — K44.9 HIATAL HERNIA: ICD-10-CM

## 2025-06-02 DIAGNOSIS — Z87.891 HISTORY OF CIGARETTE SMOKING: ICD-10-CM

## 2025-06-02 DIAGNOSIS — E66.811 CLASS 1 OBESITY DUE TO EXCESS CALORIES WITH SERIOUS COMORBIDITY AND BODY MASS INDEX (BMI) OF 31.0 TO 31.9 IN ADULT: ICD-10-CM

## 2025-06-02 DIAGNOSIS — F17.290 VAPING NICOTINE DEPENDENCE, TOBACCO PRODUCT: ICD-10-CM

## 2025-06-02 DIAGNOSIS — Z12.5 SCREENING FOR MALIGNANT NEOPLASM OF PROSTATE: ICD-10-CM

## 2025-06-02 LAB
ALBUMIN SERPL-MCNC: 4.2 G/DL (ref 3.5–5.2)
ALBUMIN/GLOB SERPL: 1.6 G/DL
ALP SERPL-CCNC: 84 U/L (ref 39–117)
ALT SERPL W P-5'-P-CCNC: 15 U/L (ref 1–41)
ANION GAP SERPL CALCULATED.3IONS-SCNC: 10.5 MMOL/L (ref 5–15)
AST SERPL-CCNC: 20 U/L (ref 1–40)
BILIRUB SERPL-MCNC: 0.7 MG/DL (ref 0–1.2)
BUN SERPL-MCNC: 17 MG/DL (ref 8–23)
BUN/CREAT SERPL: 13.5 (ref 7–25)
CALCIUM SPEC-SCNC: 9.5 MG/DL (ref 8.6–10.5)
CHLORIDE SERPL-SCNC: 107 MMOL/L (ref 98–107)
CHOLEST SERPL-MCNC: 199 MG/DL (ref 0–200)
CO2 SERPL-SCNC: 24.5 MMOL/L (ref 22–29)
CREAT SERPL-MCNC: 1.26 MG/DL (ref 0.76–1.27)
EGFRCR SERPLBLD CKD-EPI 2021: 62.5 ML/MIN/1.73
GLOBULIN UR ELPH-MCNC: 2.7 GM/DL
GLUCOSE SERPL-MCNC: 105 MG/DL (ref 65–99)
HDLC SERPL-MCNC: 51 MG/DL (ref 40–60)
LDLC SERPL CALC-MCNC: 130 MG/DL (ref 0–100)
LDLC/HDLC SERPL: 2.52 {RATIO}
POTASSIUM SERPL-SCNC: 4.3 MMOL/L (ref 3.5–5.2)
PROT SERPL-MCNC: 6.9 G/DL (ref 6–8.5)
PSA SERPL-MCNC: 2.53 NG/ML (ref 0–4)
SODIUM SERPL-SCNC: 142 MMOL/L (ref 136–145)
TRIGL SERPL-MCNC: 98 MG/DL (ref 0–150)
VLDLC SERPL-MCNC: 18 MG/DL (ref 5–40)

## 2025-06-02 PROCEDURE — 80053 COMPREHEN METABOLIC PANEL: CPT

## 2025-06-02 PROCEDURE — G0103 PSA SCREENING: HCPCS

## 2025-06-02 PROCEDURE — 80061 LIPID PANEL: CPT

## 2025-06-02 PROCEDURE — 36415 COLL VENOUS BLD VENIPUNCTURE: CPT

## 2025-06-02 RX ORDER — LISINOPRIL 40 MG/1
40 TABLET ORAL DAILY
Qty: 90 TABLET | Refills: 1 | Status: SHIPPED | OUTPATIENT
Start: 2025-06-02

## 2025-06-02 NOTE — PROGRESS NOTES
Chief Complaint  Zaheer Law presents to [facility] for Hypertension (6 mo f/u )    Subjective     History of Present Illness  Zaheer is here today to follow-up on hypertension.  He was previously prescribed lisinopril 40 mg daily and amlodipine 10 mg daily.  He reports today he is no longer taking his amlodipine 10 mg.  Since his last visit in November he has lost about 5 pounds his blood pressure is slightly better but still not in acceptable range.  He is due for labs today.He repots BP at home in 120s/70s  Does not wish to make changes.  He states on Amlodipine, BP getting too low- 100s/60s and was symptomatic.  He wants to work on lifestyle rather than go to another medication     Regarding his health maintenance, he is due for COVID shingles pneumonia tetanus vaccine and is due due for lung cancer screening in November which the order is already been placed.  He did see that he has a hiatal hernia  he is asymptomatic    Assessment and Plan     Diagnoses and all orders for this visit:    1. Essential (primary) hypertension (Primary)  Comments:  does not wish to make changes at this time, will continue to try lifestyle modification  continue with lisinopril   he stopped amlodipine  Orders:  -     lisinopril (PRINIVIL,ZESTRIL) 40 MG tablet; Take 1 tablet by mouth Daily.  Dispense: 90 tablet; Refill: 1    2. History of cigarette smoking  -      CT Chest Low Dose Cancer Screening WO; Future    3. Hiatal hernia  Comments:  discussed this finding and need to follow up   advise on modifications if symptoms develop    4. Vaping nicotine dependence, tobacco product    5. Class 1 obesity due to excess calories with serious comorbidity and body mass index (BMI) of 31.0 to 31.9 in adult  Comments:  continue to work on lifestyle modifications to improve blood pressure            Follow Up   Return in about 6 months (around 12/2/2025) for Medicare Wellness, Recheck.  Future Appointments   Date  "Time Provider Department Center   12/8/2025  8:00 AM Johanny Henry APRN [facility]       New Medications Ordered This Visit   Medications    lisinopril (PRINIVIL,ZESTRIL) 40 MG tablet     Sig: Take 1 tablet by mouth Daily.     Dispense:  90 tablet     Refill:  1       Medications Discontinued During This Encounter   Medication Reason    amLODIPine (NORVASC) 10 MG tablet Non-compliance    lisinopril (PRINIVIL,ZESTRIL) 40 MG tablet Reorder          Review of Systems    Objective     Vitals:    06/02/25 0750   BP: 145/95   BP Location: Left arm   Patient Position: Sitting   Cuff Size: Adult   Pulse: 88   Temp: 97.8 °F (36.6 °C)   TempSrc: Temporal   SpO2: 100%   Weight: 106 kg (234 lb 3.2 oz)   Height: 182.9 cm (72.01\")         Physical Exam          Result Review     PSA SCREENING (11/20/2023 09:12)  Lipid panel (05/20/2024 09:32)  Comprehensive metabolic panel (05/20/2024 09:32)   No Known Allergies   Past Medical History:   Diagnosis Date    Essential (primary) hypertension     History of cigarette smoking      Current Outpatient Medications   Medication Sig Dispense Refill    lisinopril (PRINIVIL,ZESTRIL) 40 MG tablet Take 1 tablet by mouth Daily. 90 tablet 1     No current facility-administered medications for this visit.     History reviewed. No pertinent surgical history.   Health Maintenance Due   Topic Date Due    LUNG CANCER SCREENING  12/18/2024      Immunization History   Administered Date(s) Administered    Tdap 01/01/2013         Part of this note may be an electronic transcription/translation of spoken language to printed   text using the Dragon Dictation System.      TESS Cruz  "

## 2025-06-24 ENCOUNTER — TELEPHONE (OUTPATIENT)
Dept: FAMILY MEDICINE CLINIC | Age: 70
End: 2025-06-24
Payer: MEDICARE

## 2025-06-24 NOTE — TELEPHONE ENCOUNTER
1st attempt - pt informed of overdue imaging (low dose ct scan) ordered by pcp on 6/2/25. Diagnostic # given to pt to call and reschedule his appt.

## 2025-07-21 ENCOUNTER — HOSPITAL ENCOUNTER (OUTPATIENT)
Dept: CT IMAGING | Facility: HOSPITAL | Age: 70
Discharge: HOME | End: 2025-07-21
Admitting: NURSE PRACTITIONER
Payer: MEDICARE

## 2025-07-21 DIAGNOSIS — Z87.891 HISTORY OF CIGARETTE SMOKING: ICD-10-CM

## 2025-07-21 PROCEDURE — 71271 CT THORAX LUNG CANCER SCR C-: CPT

## 2025-08-03 DIAGNOSIS — I10 ESSENTIAL (PRIMARY) HYPERTENSION: Chronic | ICD-10-CM

## 2025-08-04 RX ORDER — AMLODIPINE BESYLATE 10 MG/1
10 TABLET ORAL DAILY
Qty: 90 TABLET | Refills: 0 | OUTPATIENT
Start: 2025-08-04